# Patient Record
Sex: MALE | Race: WHITE | NOT HISPANIC OR LATINO | Employment: FULL TIME | URBAN - METROPOLITAN AREA
[De-identification: names, ages, dates, MRNs, and addresses within clinical notes are randomized per-mention and may not be internally consistent; named-entity substitution may affect disease eponyms.]

---

## 2017-05-13 ENCOUNTER — HOSPITAL ENCOUNTER (EMERGENCY)
Facility: CLINIC | Age: 47
Discharge: HOME | End: 2017-05-13
Attending: FAMILY MEDICINE | Admitting: FAMILY MEDICINE
Payer: COMMERCIAL

## 2017-05-13 VITALS
WEIGHT: 200 LBS | HEART RATE: 90 BPM | OXYGEN SATURATION: 98 % | SYSTOLIC BLOOD PRESSURE: 155 MMHG | DIASTOLIC BLOOD PRESSURE: 82 MMHG | HEIGHT: 71 IN | BODY MASS INDEX: 28 KG/M2 | RESPIRATION RATE: 16 BRPM | TEMPERATURE: 98.3 F

## 2017-05-13 DIAGNOSIS — S05.02XA CORNEAL ABRASION, LEFT, INITIAL ENCOUNTER: ICD-10-CM

## 2017-05-13 DIAGNOSIS — T15.02XA CORNEAL FOREIGN BODY, LEFT, INITIAL ENCOUNTER: ICD-10-CM

## 2017-05-13 PROCEDURE — 99283 EMERGENCY DEPT VISIT LOW MDM: CPT | Mod: 25 | Performed by: FAMILY MEDICINE

## 2017-05-13 PROCEDURE — 65220 REMOVE FOREIGN BODY FROM EYE: CPT | Mod: LT | Performed by: FAMILY MEDICINE

## 2017-05-13 PROCEDURE — 99283 EMERGENCY DEPT VISIT LOW MDM: CPT | Mod: 25

## 2017-05-13 PROCEDURE — 65222 REMOVE FOREIGN BODY FROM EYE: CPT

## 2017-05-13 RX ORDER — TETRACAINE HYDROCHLORIDE 5 MG/ML
1-2 SOLUTION OPHTHALMIC ONCE
Status: DISCONTINUED | OUTPATIENT
Start: 2017-05-13 | End: 2017-05-13 | Stop reason: HOSPADM

## 2017-05-13 ASSESSMENT — VISUAL ACUITY
OD: 20/40
OS: LIGHT PERCEPTION

## 2017-05-13 NOTE — ED AVS SNAPSHOT
[facility] Emergency Department    [address] [address]    Phone:  [phone]    Fax:  [phone]                                       Driss Saenz   MRN: [id number]    Department:  [facility] Emergency Department   Date of Visit:  5/13/2017           After Visit Summary Signature Page     I have received my discharge instructions, and my questions have been answered. I have discussed any challenges I see with this plan with the nurse or doctor.    ..........................................................................................................................................  Patient/Patient Representative Signature      ..........................................................................................................................................  Patient Representative Print Name and Relationship to Patient    ..................................................               ................................................  Date                                            Time    ..........................................................................................................................................  Reviewed by Signature/Title    ...................................................              ..............................................  Date                                                            Time

## 2017-05-13 NOTE — DISCHARGE INSTRUCTIONS
"Use Gentamicin ointment 3 times daily for up to 3 days.  Discontinue when symptoms have resolved.  Be seen if not improving each day and resolved within 3 days or at anytime if new or worsening symptoms.      Particle Removed from Eye (Corneal Foreign Body)    A particle got into your eye and stuck to the cornea (the clear part in the front of the eye). Your healthcare provider has removed this particle. The cornea is very sensitive and may still hurt for another 1 to 2 days while it heals.  If a metal particle was in your eye, a \"rust ring\" may have formed. This may require a second visit for complete removal.  Your healthcare provider may have put an eye patch on your eye. This may help the healing process. But you also may not receive an eye patch. For several types of injuries to the cornea, they are not recommended.  Home care    You may wear sunglasses to help decrease symptoms while the eye is healing, unless advised otherwise by your healthcare provider.    You may use acetaminophen or ibuprofen to control pain, unless another pain medicine was prescribed. (Note: If you have chronic liver or kidney disease, or if you have ever had a stomach ulcer or gastrointestinal bleeding, talk with your healthcare provider before using these medicines.)    If you received an eye patch:    It should not be left in place for more than 24 hours, unless advised otherwise by your healthcare provider.    Always keep your return appointment for patch removal and re-exam. Your eye could be harmed if the patch remains in place longer than advised.     Do not drive a motor vehicle or operate machinery with the patch in place. You will have difficulty judging distances with only one eye.    If eye drops or ointment were prescribed, use as directed.  Follow-up care    No eye patch: If no patch was used, but the pain continues for more than 48 hours, you should have another eye exam.     Eye patch: If your eye was patched and you " were given a return appointment for patch removal and re-exam, do not miss the visit. Again, it could be harmful to your eye if the patch remains in place longer than advised. However, if you were asked to remove the eye patch within 24 hours (or as directed by your healthcare provider), contact your healthcare provider immediately if your pain increases or get worse after removal of the eye patch.   When to seek medical advice  Call your healthcare provider right away if any of these occur:    Increasing eye pain or pain that does not improve after 24 hours    Discharge from the eye    Redness of the eye or swelling of the eyelids    Worsening vision    2975-1271 [employer]. [address] [place] [address], [place] [address]. All rights reserved. This information is not intended as a substitute for professional medical care. Always follow your healthcare professional's instructions.

## 2017-05-13 NOTE — ED PROVIDER NOTES
"  History     Chief Complaint   Patient presents with     Foreign Body in Eye     left eye     HPI  Driss Saenz is a 46 year old male who presents with left eye foreign body sensation.  Symptoms began yesterday but were worse today.  He had been working in a windy, ally environment at work but recalls no specific foreign body exposure.  Symptoms seem to get suddenly worse when he was riding his motorcycle.  He does not wear contact lenses.  He has no history of eye disease.  He has foreign body sensation but no eye pain and no loss of vision.    Patient Active Problem List   Diagnosis     CARDIOVASCULAR SCREENING; LDL GOAL LESS THAN 160     Moderate major depression (H)     Generalized anxiety disorder     No past medical history on file.  No past surgical history on file.    I have reviewed the Medications, Allergies, Past Medical and Surgical History, and Social History in the Epic system.    Review of Systems  Further problem focused system review negative.    Physical Exam   BP: 155/82  Pulse: 90  Temp: 98.3  F (36.8  C)  Resp: 16  Height: 180.3 cm (5' 11\")  Weight: 90.7 kg (200 lb)  SpO2: 98 %  Physical Exam  46-year-old in moderate discomfort due to eye irritation.  EOMI.  PERRLA.  There is left conjunctival injection without ciliary flush.  Anterior chamber is clear.  Upper lid was everted and wiped with a saline moistened cotton-tipped applicator.  This caused significant discomfort but no change in symptoms.  Examination was performed.  There is a tiny foreign body at the 9 o'clock position on the cornea at the limbus which was removed with an 18-gauge needle.  There is an ice rink sign present on the cornea on the superior aspect and a more significant abrasion at the 12 o'clock position suggestive of foreign body under the lid.  Given the finding of ice rink sign, the lid was again everted and wiped with a saline moistened cotton-tipped applicator.  No foreign body was identified.  The area was then " thoroughly irrigated and following this the foreign body sensation resolved.  There was some mild residual irritation but a clear difference in symptoms.  ED Course     ED Course     Procedures             Critical Care time:  none               Labs Ordered and Resulted from Time of ED Arrival Up to the Time of Departure from the ED - No data to display    Assessments & Plan (with Medical Decision Making)     46-year-old presents with foreign body sensation.  Slit-lamp examination showed ice rink sign.  No foreign body could be identified but irrigation led to resolution of the foreign body sensation.  He will use gentamicin ointment for 3 days 3 times daily.  He should be seen if symptoms don't improve daily and resolve within 3 days.    I have reviewed the nursing notes.    I have reviewed the findings, diagnosis, plan and need for follow up with the patient.    New Prescriptions    No medications on file       Final diagnoses:   Corneal foreign body, left, initial encounter   Corneal abrasion, left, initial encounter       5/13/2017   [facility] EMERGENCY DEPARTMENT     Lopez Velasquez MD  05/13/17 [id number]

## 2017-05-13 NOTE — ED AVS SNAPSHOT
" [facility] Emergency Department    [address] [facility] [facility] [address]    Phone:  [phone]    Fax:  [phone]                                       Driss Saenz   MRN: [id number]    Department:  [facility] Emergency Department   Date of Visit:  5/13/2017           Patient Information     Date Of Birth          1970        Your diagnoses for this visit were:     Corneal foreign body, left, initial encounter     Corneal abrasion, left, initial encounter        You were seen by Lopez Velasquez MD.        Discharge Instructions       Use Gentamicin ointment 3 times daily for up to 3 days.  Discontinue when symptoms have resolved.  Be seen if not improving each day and resolved within 3 days or at anytime if new or worsening symptoms.      Particle Removed from Eye (Corneal Foreign Body)    A particle got into your eye and stuck to the cornea (the clear part in the front of the eye). Your healthcare provider has removed this particle. The cornea is very sensitive and may still hurt for another 1 to 2 days while it heals.  If a metal particle was in your eye, a \"rust ring\" may have formed. This may require a second visit for complete removal.  Your healthcare provider may have put an eye patch on your eye. This may help the healing process. But you also may not receive an eye patch. For several types of injuries to the cornea, they are not recommended.  Home care    You may wear sunglasses to help decrease symptoms while the eye is healing, unless advised otherwise by your healthcare provider.    You may use acetaminophen or ibuprofen to control pain, unless another pain medicine was prescribed. (Note: If you have chronic liver or kidney disease, or if you have ever had a stomach ulcer or gastrointestinal bleeding, talk with your healthcare provider before using these medicines.)    If you received an eye patch:    It should not be left in place for more than 24 hours, unless advised " otherwise by your healthcare provider.    Always keep your return appointment for patch removal and re-exam. Your eye could be harmed if the patch remains in place longer than advised.     Do not drive a motor vehicle or operate machinery with the patch in place. You will have difficulty judging distances with only one eye.    If eye drops or ointment were prescribed, use as directed.  Follow-up care    No eye patch: If no patch was used, but the pain continues for more than 48 hours, you should have another eye exam.     Eye patch: If your eye was patched and you were given a return appointment for patch removal and re-exam, do not miss the visit. Again, it could be harmful to your eye if the patch remains in place longer than advised. However, if you were asked to remove the eye patch within 24 hours (or as directed by your healthcare provider), contact your healthcare provider immediately if your pain increases or get worse after removal of the eye patch.   When to seek medical advice  Call your healthcare provider right away if any of these occur:    Increasing eye pain or pain that does not improve after 24 hours    Discharge from the eye    Redness of the eye or swelling of the eyelids    Worsening vision    7530-9690 [employer]. [address] [place] [address]. All rights reserved. This information is not intended as a substitute for professional medical care. Always follow your healthcare professional's instructions.          24 Hour Appointment Hotline       To make an appointment at any [facility], call [phone] ([phone]). If you don't have a family doctor or clinic, we will help you find one. [facility] are conveniently located to serve the needs of you and your family.             Review of your medicines      Our records show that you are taking the medicines listed below. If these are incorrect, please call your family doctor or clinic.        Dose /  "Directions Last dose taken    citalopram 20 MG tablet   Commonly known as:  celeXA   Dose:  20 mg   Quantity:  30 tablet        Take 1 tablet (20 mg) by mouth daily   Refills:  1                Orders Needing Specimen Collection     None      Pending Results     No orders found from 2017 to 2017.            Pending Culture Results     No orders found from 2017 to 2017.            Pending Results Instructions     If you had any lab results that were not finalized at the time of your Discharge, you can call the ED Lab Result RN at [phone]. You will be contacted by this team for any positive Lab results or changes in treatment. The nurses are available 7 days a week from 10A to 6:30P.  You can leave a message 24 hours per day and they will return your call.        Test Results From Your Hospital Stay               Thank you for choosing [facility]       Thank you for choosing [facility] for your care. Our goal is always to provide you with excellent care. Hearing back from our patients is one way we can continue to improve our services. Please take a few minutes to complete the written survey that you may receive in the mail after you visit with us. Thank you!        "[employer]able Information     [email or web address] lets you send messages to your doctor, view your test results, renew your prescriptions, schedule appointments and more. To sign up, go to [email or web address] . Click on \"Log in\" on the left side of the screen, which will take you to the Welcome page. Then click on \"Sign up Now\" on the right side of the page.     You will be asked to enter the access code listed below, as well as some personal information. Please follow the directions to create your username and password.     Your access code is: [email or web address]  Expires: 2017 12:40 PM     Your access code will  in 90 days. If you need help or a new code, please call your [facility] clinic or [phone].        Care EveryWhere ID     This is " your Care EveryWhere ID. This could be used by other organizations to access your [facility] medical records  [id number]        After Visit Summary       This is your record. Keep this with you and show to your community pharmacist(s) and doctor(s) at your next visit.

## 2024-05-08 ENCOUNTER — APPOINTMENT (OUTPATIENT)
Dept: GENERAL RADIOLOGY | Facility: CLINIC | Age: 54
End: 2024-05-08
Attending: PHYSICIAN ASSISTANT
Payer: COMMERCIAL

## 2024-05-08 ENCOUNTER — HOSPITAL ENCOUNTER (EMERGENCY)
Facility: CLINIC | Age: 54
Discharge: HOME | End: 2024-05-08
Attending: PHYSICIAN ASSISTANT | Admitting: PHYSICIAN ASSISTANT
Payer: COMMERCIAL

## 2024-05-08 VITALS
HEART RATE: 87 BPM | TEMPERATURE: 98.1 F | DIASTOLIC BLOOD PRESSURE: 70 MMHG | OXYGEN SATURATION: 98 % | SYSTOLIC BLOOD PRESSURE: 145 MMHG | RESPIRATION RATE: 16 BRPM

## 2024-05-08 DIAGNOSIS — L03.90 CELLULITIS: ICD-10-CM

## 2024-05-08 PROCEDURE — 73070 X-RAY EXAM OF ELBOW: CPT | Mod: RT

## 2024-05-08 PROCEDURE — G0463 HOSPITAL OUTPT CLINIC VISIT: HCPCS | Performed by: PHYSICIAN ASSISTANT

## 2024-05-08 PROCEDURE — 99203 OFFICE O/P NEW LOW 30 MIN: CPT | Performed by: PHYSICIAN ASSISTANT

## 2024-05-08 RX ORDER — CEPHALEXIN 500 MG/1
500 CAPSULE ORAL 4 TIMES DAILY
Qty: 28 CAPSULE | Refills: 0 | Status: SHIPPED | OUTPATIENT
Start: 2024-05-08 | End: 2024-05-15

## 2024-05-08 ASSESSMENT — ACTIVITIES OF DAILY LIVING (ADL): ADLS_ACUITY_SCORE: 35

## 2024-05-08 ASSESSMENT — COLUMBIA-SUICIDE SEVERITY RATING SCALE - C-SSRS
2. HAVE YOU ACTUALLY HAD ANY THOUGHTS OF KILLING YOURSELF IN THE PAST MONTH?: NO
6. HAVE YOU EVER DONE ANYTHING, STARTED TO DO ANYTHING, OR PREPARED TO DO ANYTHING TO END YOUR LIFE?: NO
1. IN THE PAST MONTH, HAVE YOU WISHED YOU WERE DEAD OR WISHED YOU COULD GO TO SLEEP AND NOT WAKE UP?: NO

## 2024-05-08 NOTE — ED PROVIDER NOTES
History   No chief complaint on file.    HPI  Driss Saenz is a 53 year old male who presents urgent care with concern for right elbow pain after injury 3 days prior to arrival.  Patient reports that he was walking on vinyl steps when he tripped and fell hitting his elbow on several steps on the way down.  He sustained a pinpoint wound to the posterior aspect of the elbow which has intermittently had spurting yellow/red fluid.  He became more concerned when he developed increasing erythema, arm swelling within the last 24 hours.  He denies any fever, chills, myalgias, distal numbness or paresthesias.  No known history of MRSA.  His tetanus vaccine is out of date.     Allergies:  Allergies   Allergen Reactions    Sulfa Antibiotics        Problem List:    Patient Active Problem List    Diagnosis Date Noted    Moderate major depression (H) 09/30/2014     Priority: Medium    Generalized anxiety disorder 09/30/2014     Priority: Medium     Diagnosis updated by automated process. Provider to review and confirm.      CARDIOVASCULAR SCREENING; LDL GOAL LESS THAN 160 10/31/2010     Priority: Medium        Past Medical History:    No past medical history on file.    Past Surgical History:    No past surgical history on file.    Family History:    Family History   Problem Relation Age of Onset    Cancer Paternal Grandfather     Prostate Cancer Paternal Grandfather     Prostate Cancer Father        Social History:  Marital Status:  Single [1]  Social History     Tobacco Use    Smoking status: Never    Smokeless tobacco: Former     Types: Chew   Substance Use Topics    Alcohol use: Yes     Comment: 1 case beers per week    Drug use: No        Medications:    citalopram (CELEXA) 20 MG tablet      Review of Systems  CONSTITUTIONAL:NEGATIVE for fever, chills, change in weight  INTEGUMENTARY/SKIN: POSITIVE for erythema, wound right elbow   RESP:NEGATIVE for significant cough or SOB  MUSCULOSKELETAL: POSITIVE  for right elbow  pain, swelling and NEGATIVE for other concerning arthralgias or myalgias   NEURO: NEGATIVE for numbness, paresthesias   Physical Exam   BP: (!) 145/70  Pulse: 87  Temp: 98.1  F (36.7  C)  Resp: 16  SpO2: 98 %  Physical Exam  Constitutional:       General: He is not in acute distress.     Appearance: He is not ill-appearing or toxic-appearing.   HENT:      Head: Normocephalic and atraumatic.   Cardiovascular:      Pulses:           Radial pulses are 2+ on the right side.   Musculoskeletal:      Right elbow: Swelling present. No effusion. Normal range of motion. Tenderness present in olecranon process.      Right wrist: Normal.      Comments: Pinpoint size wound to the posterior aspect of the right elbow overlying olecranon process and olecranon bursa, some swelling olecranon bursa, diffuse erythema of the elbow, distal upper arm, proximal forearm    Skin:     General: Skin is warm and dry.      Findings: Erythema and wound present. No ecchymosis or rash.   Neurological:      Mental Status: He is alert.      Sensory: No sensory deficit.       ED Course        Procedures       Critical Care time:  none          Results for orders placed or performed during the hospital encounter of 05/08/24 (from the past 24 hour(s))   Elbow XR, 2 views, right    Narrative    Examination:  XR ELBOW RIGHT 2 VIEWS    Date:  5/8/2024 4:07 PM     Clinical Information: Right elbow pain and swelling, history of a  fall.    Comparison: none.      Impression    Impression:    1.  Soft tissue swelling over the posterior elbow. No fracture or  erosion. Normal joint alignment and spacing. No significant joint  effusion.     SATYA MONTAÑO MD         SYSTEM ID:  [id number]     Medications - No data to display    Assessments & Plan (with Medical Decision Making)     I have reviewed the nursing notes.  I have reviewed the findings, diagnosis, plan and need for follow up with the patient.       New Prescriptions    CEPHALEXIN (KEFLEX) 500 MG  CAPSULE    Take 1 capsule (500 mg) by mouth 4 times daily for 7 days     Final diagnoses:   Cellulitis     53-year-old male presents urgent care with concern for right elbow pain, swelling after he sustained a fall earlier this week with increasing erythema, warmth, discomfort the last several days.  Physical exam findings are consistent with cellulitis with diffuse erythema, warmth, overlying elbow,  proximal forearm, distal upper arm.  He had pinpoint wound on the posterior aspect of the elbow overlying the olecranon bursa which he states intermittently drains clear yellow fluid concern for possible infectious bursitis or bursa injury. I do not suspect septic arthritis.  X-ray was obtained and was negative for evidence of acute bony abnormality, radiopaque foreign body.  He refused to update tetanus vaccine at this time.  He discharged home stable with prescription for Keflex and instructions for rest of the elbow, warm compresses 10 to 15 minutes 3-4 times daily.  Follow-up with Ortho if no improvement within the next 3 to 4 days,  referral placed.     Disclaimer: This note consists of symbols derived from keyboarding, dictation, and/or voice recognition software. As a result, there may be errors in the script that have gone undetected.  Please consider this when interpreting information found in the chart.    5/8/2024   [facility] EMERGENCY DEPT       Mary Marroquin PA-C  05/08/24 2099

## 2024-08-29 ENCOUNTER — OFFICE VISIT (OUTPATIENT)
Dept: FAMILY MEDICINE | Facility: CLINIC | Age: 54
End: 2024-08-29
Payer: COMMERCIAL

## 2024-08-29 VITALS
TEMPERATURE: 97.5 F | SYSTOLIC BLOOD PRESSURE: 128 MMHG | RESPIRATION RATE: 16 BRPM | WEIGHT: 200.8 LBS | BODY MASS INDEX: 28.75 KG/M2 | OXYGEN SATURATION: 98 % | HEART RATE: 71 BPM | DIASTOLIC BLOOD PRESSURE: 86 MMHG | HEIGHT: 70 IN

## 2024-08-29 DIAGNOSIS — K64.4 EXTERNAL HEMORRHOIDS: ICD-10-CM

## 2024-08-29 DIAGNOSIS — K64.8 INTERNAL HEMORRHOIDS: ICD-10-CM

## 2024-08-29 DIAGNOSIS — Z12.11 SCREEN FOR COLON CANCER: ICD-10-CM

## 2024-08-29 DIAGNOSIS — N52.9 ERECTILE DYSFUNCTION, UNSPECIFIED ERECTILE DYSFUNCTION TYPE: ICD-10-CM

## 2024-08-29 DIAGNOSIS — Z30.2 ENCOUNTER FOR VASECTOMY: Primary | ICD-10-CM

## 2024-08-29 PROBLEM — N52.8 OTHER MALE ERECTILE DYSFUNCTION: Status: ACTIVE | Noted: 2024-08-29

## 2024-08-29 LAB
ALBUMIN SERPL BCG-MCNC: 4.3 G/DL (ref 3.5–5.2)
ALP SERPL-CCNC: 77 U/L (ref 40–150)
ALT SERPL W P-5'-P-CCNC: 29 U/L (ref 0–70)
ANION GAP SERPL CALCULATED.3IONS-SCNC: 8 MMOL/L (ref 7–15)
AST SERPL W P-5'-P-CCNC: 26 U/L (ref 0–45)
BILIRUB SERPL-MCNC: 0.4 MG/DL
BUN SERPL-MCNC: 13.5 MG/DL (ref 6–20)
CALCIUM SERPL-MCNC: 9.2 MG/DL (ref 8.8–10.4)
CHLORIDE SERPL-SCNC: 106 MMOL/L (ref 98–107)
CHOLEST SERPL-MCNC: 170 MG/DL
CREAT SERPL-MCNC: 1.06 MG/DL (ref 0.67–1.17)
EGFRCR SERPLBLD CKD-EPI 2021: 85 ML/MIN/1.73M2
ERYTHROCYTE [DISTWIDTH] IN BLOOD BY AUTOMATED COUNT: 12.4 % (ref 10–15)
FASTING STATUS PATIENT QL REPORTED: YES
FASTING STATUS PATIENT QL REPORTED: YES
GLUCOSE SERPL-MCNC: 98 MG/DL (ref 70–99)
HBA1C MFR BLD: 5.1 % (ref 0–5.6)
HCO3 SERPL-SCNC: 27 MMOL/L (ref 22–29)
HCT VFR BLD AUTO: 43.8 % (ref 40–53)
HDLC SERPL-MCNC: 55 MG/DL
HGB BLD-MCNC: 14.3 G/DL (ref 13.3–17.7)
LDLC SERPL CALC-MCNC: 103 MG/DL
MCH RBC QN AUTO: 29.9 PG (ref 26.5–33)
MCHC RBC AUTO-ENTMCNC: 32.6 G/DL (ref 31.5–36.5)
MCV RBC AUTO: 92 FL (ref 78–100)
NONHDLC SERPL-MCNC: 115 MG/DL
PLATELET # BLD AUTO: 325 10E3/UL (ref 150–450)
POTASSIUM SERPL-SCNC: 4.4 MMOL/L (ref 3.4–5.3)
PROT SERPL-MCNC: 7.3 G/DL (ref 6.4–8.3)
RBC # BLD AUTO: 4.78 10E6/UL (ref 4.4–5.9)
SODIUM SERPL-SCNC: 141 MMOL/L (ref 135–145)
TRIGL SERPL-MCNC: 62 MG/DL
TSH SERPL DL<=0.005 MIU/L-ACNC: 1.33 UIU/ML (ref 0.3–4.2)
WBC # BLD AUTO: 5.6 10E3/UL (ref 4–11)

## 2024-08-29 PROCEDURE — 84403 ASSAY OF TOTAL TESTOSTERONE: CPT | Performed by: NURSE PRACTITIONER

## 2024-08-29 PROCEDURE — 90471 IMMUNIZATION ADMIN: CPT | Performed by: NURSE PRACTITIONER

## 2024-08-29 PROCEDURE — 80053 COMPREHEN METABOLIC PANEL: CPT | Performed by: NURSE PRACTITIONER

## 2024-08-29 PROCEDURE — 85027 COMPLETE CBC AUTOMATED: CPT | Performed by: NURSE PRACTITIONER

## 2024-08-29 PROCEDURE — 84443 ASSAY THYROID STIM HORMONE: CPT | Performed by: NURSE PRACTITIONER

## 2024-08-29 PROCEDURE — 90715 TDAP VACCINE 7 YRS/> IM: CPT | Performed by: NURSE PRACTITIONER

## 2024-08-29 PROCEDURE — 80061 LIPID PANEL: CPT | Performed by: NURSE PRACTITIONER

## 2024-08-29 PROCEDURE — 99213 OFFICE O/P EST LOW 20 MIN: CPT | Mod: 25 | Performed by: NURSE PRACTITIONER

## 2024-08-29 PROCEDURE — 36415 COLL VENOUS BLD VENIPUNCTURE: CPT | Performed by: NURSE PRACTITIONER

## 2024-08-29 PROCEDURE — 83036 HEMOGLOBIN GLYCOSYLATED A1C: CPT | Performed by: NURSE PRACTITIONER

## 2024-08-29 ASSESSMENT — ANXIETY QUESTIONNAIRES
GAD7 TOTAL SCORE: 2
7. FEELING AFRAID AS IF SOMETHING AWFUL MIGHT HAPPEN: NOT AT ALL
2. NOT BEING ABLE TO STOP OR CONTROL WORRYING: NOT AT ALL
6. BECOMING EASILY ANNOYED OR IRRITABLE: NOT AT ALL
1. FEELING NERVOUS, ANXIOUS, OR ON EDGE: NOT AT ALL
3. WORRYING TOO MUCH ABOUT DIFFERENT THINGS: NOT AT ALL
GAD7 TOTAL SCORE: 2
IF YOU CHECKED OFF ANY PROBLEMS ON THIS QUESTIONNAIRE, HOW DIFFICULT HAVE THESE PROBLEMS MADE IT FOR YOU TO DO YOUR WORK, TAKE CARE OF THINGS AT HOME, OR GET ALONG WITH OTHER PEOPLE: NOT DIFFICULT AT ALL
5. BEING SO RESTLESS THAT IT IS HARD TO SIT STILL: NOT AT ALL

## 2024-08-29 ASSESSMENT — PATIENT HEALTH QUESTIONNAIRE - PHQ9
SUM OF ALL RESPONSES TO PHQ QUESTIONS 1-9: 1
SUM OF ALL RESPONSES TO PHQ QUESTIONS 1-9: 1
5. POOR APPETITE OR OVEREATING: MORE THAN HALF THE DAYS
10. IF YOU CHECKED OFF ANY PROBLEMS, HOW DIFFICULT HAVE THESE PROBLEMS MADE IT FOR YOU TO DO YOUR WORK, TAKE CARE OF THINGS AT HOME, OR GET ALONG WITH OTHER PEOPLE: NOT DIFFICULT AT ALL

## 2024-08-29 ASSESSMENT — PAIN SCALES - GENERAL: PAINLEVEL: NO PAIN (0)

## 2024-08-29 NOTE — LETTER
August 29, 2024      Driss Saenz  [address] [place] [id number]        Dear ,    We are writing to inform you of your test results.    Please send a letter with results:     Your blood levels, diabetes screening, electrolytes, kidney/liver function, thyroid and cholesterol testing is all normal.  The testosterone testing can take a couple days to come back.  I will notify you of this result when I see it.   Kami Champagne NP on 8/29/2024 at 10:43 AM     Resulted Orders   CBC with platelets   Result Value Ref Range    WBC Count 5.6 4.0 - 11.0 10e3/uL    RBC Count 4.78 4.40 - 5.90 10e6/uL    Hemoglobin 14.3 13.3 - 17.7 g/dL    Hematocrit 43.8 40.0 - 53.0 %    MCV 92 78 - 100 fL    MCH 29.9 26.5 - 33.0 pg    MCHC 32.6 31.5 - 36.5 g/dL    RDW 12.4 10.0 - 15.0 %    Platelet Count 325 150 - 450 10e3/uL   Comprehensive metabolic panel (BMP + Alb, Alk Phos, ALT, AST, Total. Bili, TP)   Result Value Ref Range    Sodium 141 135 - 145 mmol/L    Potassium 4.4 3.4 - 5.3 mmol/L    Carbon Dioxide (CO2) 27 22 - 29 mmol/L    Anion Gap 8 7 - 15 mmol/L    Urea Nitrogen 13.5 6.0 - 20.0 mg/dL    Creatinine 1.06 0.67 - 1.17 mg/dL    GFR Estimate 85 >60 mL/min/1.73m2      Comment:      eGFR calculated using 2021 CKD-EPI equation.    Calcium 9.2 8.8 - 10.4 mg/dL      Comment:      Reference intervals for this test were updated on 7/16/2024 to reflect our healthy population more accurately. There may be differences in the flagging of prior results with similar values performed with this method. Those prior results can be interpreted in the context of the updated reference intervals.    Chloride 106 98 - 107 mmol/L    Glucose 98 70 - 99 mg/dL    Alkaline Phosphatase 77 40 - 150 U/L    AST 26 0 - 45 U/L    ALT 29 0 - 70 U/L    Protein Total 7.3 6.4 - 8.3 g/dL    Albumin 4.3 3.5 - 5.2 g/dL    Bilirubin Total 0.4 <=1.2 mg/dL    Patient Fasting > 8hrs? Yes       Comment:      Fasting since 1900 on 08/28/2024   TSH with  free T4 reflex   Result Value Ref Range    TSH 1.33 0.30 - 4.20 uIU/mL   Lipid panel reflex to direct LDL Non-fasting   Result Value Ref Range    Cholesterol 170 <200 mg/dL    Triglycerides 62 <150 mg/dL    Direct Measure HDL 55 >=40 mg/dL    LDL Cholesterol Calculated 103 (H) <=100 mg/dL    Non HDL Cholesterol 115 <130 mg/dL    Patient Fasting > 8hrs? Yes       Comment:      Fasting since 1900 on 08/28/2024  Fasting since 1900 on 08/28/2024  Fasting since 1900 on 08/28/2024    Narrative    Cholesterol  Desirable:  <200 mg/dL    Triglycerides  Normal:  Less than 150 mg/dL  Borderline High:  150-199 mg/dL  High:  200-499 mg/dL  Very High:  Greater than or equal to 500 mg/dL    Direct Measure HDL  Female:  Greater than or equal to 50 mg/dL   Male:  Greater than or equal to 40 mg/dL    LDL Cholesterol  Desirable:  <100mg/dL  Above Desirable:  100-129 mg/dL   Borderline High:  130-159 mg/dL   High:  160-189 mg/dL   Very High:  >= 190 mg/dL    Non HDL Cholesterol  Desirable:  130 mg/dL  Above Desirable:  130-159 mg/dL  Borderline High:  160-189 mg/dL  High:  190-219 mg/dL  Very High:  Greater than or equal to 220 mg/dL   Hemoglobin A1c   Result Value Ref Range    Hemoglobin A1C 5.1 0.0 - 5.6 %      Comment:      Normal <5.7%   Prediabetes 5.7-6.4%    Diabetes 6.5% or higher     Note: Adopted from ADA consensus guidelines.       If you have any questions or concerns, please call the clinic at the number listed above.       Sincerely,      Kami Champagne NP

## 2024-08-29 NOTE — PATIENT INSTRUCTIONS
Make an appointment with general surgery for hemorrhoids.  Make appointment with urology for vasectomy consult.  Make appointment for colonoscopy.  Get labs completed.  I will notify you of results.  If negative, I can continue Cialis treatment if this is less expensive for you.

## 2024-08-29 NOTE — PROGRESS NOTES
Assessment & Plan     Encounter for vasectomy  Urology referral placed for vasectomy consult.  - Adult Urology  Referral; Future    External hemorrhoids  Due to daily issues with hemorrhoids, referred to general surgery for consult on hemorrhoidectomy.  - Adult Gen Surg  Referral; Future    Internal hemorrhoids  See note above.  - Adult Gen Surg  Referral; Future    Erectile dysfunction, unspecified erectile dysfunction type  Patient has been treating himself with [facility] online erectile dysfunction treatment of Cialis.  I am working up testosterone, CBC, CMP, TSH, lipid, and A1c for evaluation of his rectal dysfunction.  If negative would fill his Cialis to see if this is cheaper than online.  If any abnormalities patient will be referred to urology or endocrinology for treatment.  If labs are all negative, would plan to continue Cialis dosing.  - Testosterone, total; Future  - CBC with platelets; Future  - Comprehensive metabolic panel (BMP + Alb, Alk Phos, ALT, AST, Total. Bili, TP); Future  - TSH with free T4 reflex; Future  - Lipid panel reflex to direct LDL Non-fasting; Future  - Hemoglobin A1c; Future  - Testosterone, total  - CBC with platelets  - Comprehensive metabolic panel (BMP + Alb, Alk Phos, ALT, AST, Total. Bili, TP)  - TSH with free T4 reflex  - Lipid panel reflex to direct LDL Non-fasting  - Hemoglobin A1c    Screen for colon cancer  Colonoscopy ordered for screening due to hand family history  - REVIEW OF HEALTH MAINTENANCE PROTOCOL ORDERS  - Colonoscopy Screening  Referral; Future    See Patient Instructions    Stephie Naqvi is a 53 year old, presenting for the following health issues:  Rectal Problem and Consult (Vasectomy )        8/29/2024     7:39 AM   Additional Questions   Roomed by rmb   Accompanied by self         8/29/2024     7:39 AM   Patient Reported Additional Medications   Patient reports taking the following new medications none     History  "of Present Illness       Reason for visit:  Hemroids vastectomy inquarry  Symptoms include:  Bleeding  Symptom intensity:  Moderate  Symptom progression:  Staying the same  Had these symptoms before:  Yes  Has tried/received treatment for these symptoms:  No  What makes it worse:  Needles  What makes it better:  Beer   He is taking medications regularly.     Patient has been having issues with internal and external hemorrhoids for about 20 to 25 years.  He states it is bothering him daily.  He has to use toilet paper between his cheeks to stop the bleeding.  He denies any constipation and states that his bowels have been normal daily.    He would like a referral for vasectomy today.    Patient has history of erectile dysfunction.  He describes that he does have a sexual appetite, he has no trouble having an erection which is keeping the erection long enough.  He has been online to [facility] who has been treating him with Cialis daily.  He states that no one is ever worked up the cause for this    Review of Systems  CONSTITUTIONAL: NEGATIVE for fever, chills, change in weight  RESP: NEGATIVE for significant cough or SOB  CV: NEGATIVE for chest pain, palpitations or peripheral edema  GI: POSITIVE for hemorrhoids  : POSITIVE  for erectile dysfunction  PSYCHIATRIC: NEGATIVE for changes in mood or affect  ROS otherwise negative      Objective    /86   Pulse 71   Temp 97.5  F (36.4  C) (Tympanic)   Resp 16   Ht 1.774 m (5' 9.84\")   Wt 91.1 kg (200 lb 12.8 oz)   SpO2 98%   BMI 28.94 kg/m    Body mass index is 28.94 kg/m .  Physical Exam   GENERAL: alert and no distress  RESP: lungs clear to auscultation - no rales, rhonchi or wheezes  CV: regular rate and rhythm, normal S1 S2, no S3 or S4, no murmur, click or rub, no peripheral edema  PSYCH: mentation appears normal, affect normal/bright        Signed Electronically by: Kami Champagne NP    "

## 2024-09-03 DIAGNOSIS — N52.9 ERECTILE DYSFUNCTION, UNSPECIFIED ERECTILE DYSFUNCTION TYPE: Primary | ICD-10-CM

## 2024-09-03 LAB — TESTOST SERPL-MCNC: 374 NG/DL (ref 240–950)

## 2024-09-03 RX ORDER — TADALAFIL 2.5 MG/1
2.5 TABLET ORAL DAILY
Qty: 90 TABLET | Refills: 3 | Status: SHIPPED | OUTPATIENT
Start: 2024-09-03 | End: 2024-09-06

## 2024-09-05 NOTE — PROGRESS NOTES
UROLOGY CLINIC NEW VISIT    Chief Complaint:  Desire for elective sterilization, ED, PD    Referring Provider:  Christian Salomon    Subjective:     Driss Saenz is a 53 year old male and is a new patient to the urology clinic seen today for family planning in consultation for Dr. Salomon, also with ED and stable phase PD.     No prior surgeries, no other significant medical history.   No anticoagulation or bleeding dyscrasias. 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 Had taken Benzo for Lasic eye surgery. 5/10 Erection  Has discussed with partner they are in agreement. Has 4 children     ED 1-2 years. Problems keeping erection, hard to get  Has cialis script has not tried yet. Took HIMs,   T 374     PD/Curvature is present; is estimated 45 degrees. Causing some pain. Has had some slow progression, stable now. No pain at rest.      ASCVD Risk 6.7%, he has appropriate METS     Family history prostate cancer 78, no DM or Dupytrens     Currently the patient has no fever, chills, nausea, vomiting or other signs/symptoms suggestive of acute systemic infection.    PMH  No past medical history on file.  PSH  No past surgical history on file.  FAMHx  Family History   Problem Relation Age of Onset    Cancer Paternal Grandfather     Prostate Cancer Paternal Grandfather     Prostate Cancer Father      ALLERGY  Allergies   Allergen Reactions    Sulfa Antibiotics      MEDICATIONS  has a current medication list which includes the following prescription(s): tadalafil.  ROS:  Constitutional: negative  Eyes: negative  Ears/Nose/Throat/Mouth: negative  Respiratory: negative  Cardiovascular: negative  Gastrointestinal: negative  Genito-Urinary: as documented above in HPI  Musculoskeletal: negative  Neurological: negative  Integumentary: negative      Objective:     There were no vitals taken for this visit.   Physical Exam:  GENERAL: Patient is AOx3, in no acute distress  CARDIAC: regular rate  LUNG: breathing non labored  ABD: soft,  "nondistended  : normal circumcised phallus, testes descended bilaterally  Testes without masses.  Vasa and epididymides are present bilaterally.    Vasa palpable bilaterally 2   Prostate: defer       LABORATORY:  No results found for: \"CREATININE\"  No results found for: \"PSA\"     Assessment:       Driss Saenz is a 53 year old male and is a new patient to the urology clinic seen today for family planning in consultation, desiring elective vasectomy, ED, PD.      Plan:     We discussed with the patient the following:  Vasectomy is intended to be a permanent form of contraception.   Vasectomy does not produce immediate sterility.   Following vasectomy, another form of contraception is required until vas occlusion is confirmed by post- vasectomy semen analysis (PVSA).   Even after vas occlusion is confirmed, vasectomy is not 100% reliable in preventing pregnancy.   The risk of pregnancy after vasectomy is approximately 1 in 2,000 for men who have post-vasectomy azoospermia or PVSA showing rare non-motile sperm (RNMS).   Repeat vasectomy is necessary in ?1% of vasectomies, provided that a technique for vas occlusion known to have a low occlusive failure rate has been used.   Patients should refrain from ejaculation for approximately one week after vasectomy.   Options for fertility after vasectomy include vasectomy reversal and sperm retrieval with in vitro fertilization. These options are not always successful, and they may be expensive.   The rates of surgical complications such as symptomatic hematoma and infection are 1-2%. These rates vary with the surgeon's experience and the criteria used to diagnose these conditions.   Chronic scrotal pain associated with negative impact on quality of life occurs after vasectomy in about 1-2% of men. Few of these men require additional surgery.   Other permanent and non-permanent alternatives to vasectomy are available.    Patient with significant Vasovagal episode, after " discussion would be unwise to perform Vasectomy in office. Plan for vasectomy in the OR after clearance by PCP/Cards, patient aware.   Recommended he go to ER for evaluation, however vitals stabilized and patient preferred not to go to ED favoring re-evaluation with PCP. Discussed risks benefits and return precautions. He will make appointment with PCP promptly.     ED    - Elevated ASCVD, refer cardiology for potential CAC testing discussed with patient and recommended especially given syncopal episode   - Cialis daily, precautions discussed     PD  - Seems to be in stable phase, plan for Cialis daily co treat PD as well   - Want to complete Vasectomy first, will see how curvature is with better erections. Will likely need ICI or Duplex for formal curvature assessment     BPH with LUTS   - Cialis daily    Men's health, family history prostate cancer   - PSA screen today

## 2024-09-06 ENCOUNTER — OFFICE VISIT (OUTPATIENT)
Dept: UROLOGY | Facility: CLINIC | Age: 54
End: 2024-09-06
Attending: NURSE PRACTITIONER
Payer: COMMERCIAL

## 2024-09-06 VITALS — HEART RATE: 64 BPM | TEMPERATURE: 97 F | SYSTOLIC BLOOD PRESSURE: 131 MMHG | DIASTOLIC BLOOD PRESSURE: 70 MMHG

## 2024-09-06 DIAGNOSIS — N40.0 BENIGN PROSTATIC HYPERPLASIA, UNSPECIFIED WHETHER LOWER URINARY TRACT SYMPTOMS PRESENT: ICD-10-CM

## 2024-09-06 DIAGNOSIS — Z30.2 ENCOUNTER FOR VASECTOMY: ICD-10-CM

## 2024-09-06 DIAGNOSIS — N52.01 ERECTILE DYSFUNCTION DUE TO ARTERIAL INSUFFICIENCY: ICD-10-CM

## 2024-09-06 DIAGNOSIS — N48.6 PEYRONIE'S DISEASE: ICD-10-CM

## 2024-09-06 DIAGNOSIS — Z80.42 FAMILY HISTORY OF PROSTATE CANCER: Primary | ICD-10-CM

## 2024-09-06 PROCEDURE — 99204 OFFICE O/P NEW MOD 45 MIN: CPT | Performed by: STUDENT IN AN ORGANIZED HEALTH CARE EDUCATION/TRAINING PROGRAM

## 2024-09-06 RX ORDER — TADALAFIL 5 MG/1
5 TABLET ORAL DAILY
Qty: 60 TABLET | Refills: 4 | Status: SHIPPED | OUTPATIENT
Start: 2024-09-06 | End: 2025-07-03

## 2024-09-06 ASSESSMENT — PAIN SCALES - GENERAL: PAINLEVEL: NO PAIN (0)

## 2024-09-09 ENCOUNTER — OFFICE VISIT (OUTPATIENT)
Dept: SURGERY | Facility: CLINIC | Age: 54
End: 2024-09-09
Attending: NURSE PRACTITIONER
Payer: COMMERCIAL

## 2024-09-09 VITALS
OXYGEN SATURATION: 99 % | SYSTOLIC BLOOD PRESSURE: 160 MMHG | HEART RATE: 101 BPM | DIASTOLIC BLOOD PRESSURE: 89 MMHG | WEIGHT: 202 LBS | HEIGHT: 70 IN | BODY MASS INDEX: 28.92 KG/M2

## 2024-09-09 DIAGNOSIS — K64.8 INTERNAL HEMORRHOIDS: Primary | ICD-10-CM

## 2024-09-09 DIAGNOSIS — L29.0 PRURITUS ANI: ICD-10-CM

## 2024-09-09 DIAGNOSIS — K64.4 EXTERNAL HEMORRHOIDS: ICD-10-CM

## 2024-09-09 PROCEDURE — 99243 OFF/OP CNSLTJ NEW/EST LOW 30: CPT | Performed by: STUDENT IN AN ORGANIZED HEALTH CARE EDUCATION/TRAINING PROGRAM

## 2024-09-09 RX ORDER — ZINC OXIDE 20 %
OINTMENT (GRAM) TOPICAL PRN
Qty: 30 G | Refills: 1 | Status: SHIPPED | OUTPATIENT
Start: 2024-09-09

## 2024-09-09 NOTE — LETTER
"9/9/2024      Driss Saenz  [address] [place] [id number]      Dear Colleague,    Thank you for referring your patient, Driss Saenz, to the [facility]. Please see a copy of my visit note below.    Surgical Consultation/History and Physical  [facility]    Driss is seen in consultation for hemorrhoids, at the request of Kami Champagne NP.    Chief Complaint:  rectal bleeding and discomfort    History of Present Illness: Driss Saenz is a 54 year old male who presents to surgery clinic for evaluation of rectal bleeding and discomfort. Symptoms have been present for several years and are associated with anal itching. Prior episodes in the past involved symptoms worse with wiping. Patient describes blood in stool as bright red and dripping into toilet bowl, sometimes requiring tucking toilet paper between buttocks to catch blood. Describes bowel habits as regular, sometimes 1-2 times daily, denies straining. Denies any prior episodes of gas or fecal incontinence. Has not tried any treatments  for management of this in the past.  Denies any abdominal pain, nausea, emesis. Has had a colonoscopy before. States he drinks about 1-2 L of water daily, does not eat many vegetables and usually has a \"meat and potatoes\" diet.    Patient Active Problem List   Diagnosis     CARDIOVASCULAR SCREENING; LDL GOAL LESS THAN 160     Other male erectile dysfunction       No past medical history on file.    No past surgical history on file.    Family History   Problem Relation Age of Onset     Cancer Paternal Grandfather      Prostate Cancer Paternal Grandfather      Prostate Cancer Father        Social History     Tobacco Use     Smoking status: Never     Smokeless tobacco: Current     Tobacco comments:     Nicotine pouches    Substance Use Topics     Alcohol use: Yes     Comment: 3-6 beers a day or a case beer per week        History   Drug Use No       Current Outpatient " "Medications   Medication Sig Dispense Refill     tadalafil (CIALIS) 5 MG tablet Take 1 tablet (5 mg) by mouth daily. (Patient not taking: Reported on 9/9/2024) 60 tablet 4       Allergies   Allergen Reactions     Sulfa Antibiotics        Review of Systems:   10 point ROS negative other than what was listed in HPI    Physical Exam:  BP (!) 160/89   Pulse 101   Ht 1.774 m (5' 9.84\")   Wt 91.6 kg (202 lb)   SpO2 99%   BMI 29.12 kg/m      Constitutional- No acute distress, well nourished, non-toxic  Eyes: Anicteric, no injection.  PERRL  ENT:  Normocephalic, atraumatic, Nose midline, moist mucus membranes  Neck - supple, no LAD  Respiratory- Nonlabored breathing on room air  Cardiovascular - Extremities warm and well perfused  Abdomen - Soft, non-tender, +BS, no hepatosplenomegaly, no palpable masses  Groins - 2+ pulses bilaterally and no LAD, no masses  Rectal -external exam with perianal excoriation.  Digital rectal exam with no appreciable masses, good tone, guaiac negative.  Anoscope exam revealed enlarged internal hemorrhoids with no active bleeding, more prominent in left lateral quadrant.  Neuro - No focal neuro deficits, Alert and oriented x 3  Psych: Appropriate mood and affect  Musculoskeletal: Normal gait, symmetric strength.  FROM upper and lower extremities.  Skin: Warm, Dry    Assessment:  1. Driss Saenz is a 54 year old male who presents to surgery clinic for evaluation of anal bleeding and discomfort. These symptoms appear to be consistent with both pruritus ani andsymptomatic internal hemorrhoids. They are Grade I based on described symptoms. Explained to the patient the pathophysiology of hemorrhoids and that these are natural collections of submucosal sinusoids within the anorectum.  Symptomatic hemorrhoids can come from chronic irritation that causes vascular congestion and enlargement of hemorrhoids.  I explained that chronic irritation can also cause pruritus ani, leading to symptoms of " itching and discomfort.  Explained to the patient the pathophysiology of pruritus ani. For both hemorrhoids and pruritus ani, I recommended treatment to improve anal hygiene, dietary modification, and protection of skin. This can be accomplished with increasing fiber intake to 30 grams /day, increasing water intake to 2 L / day.  I counseled the patient to track and write down exactly how they are consuming and how to monitor the fiber content of foods through nutritional labels. This should help promote good colon health, bulk stools, and decrease stool residue on the perianal skin    For symptom management, I recommend keeping the area clean daily sitz baths. Zinc oxide cream to be prescribed to protect perianal skin.     I also explained that procedural intervention for hemorrhoids is an option, including possible banding or surgical excision. However, as he has not tried treatment for hemorrhoids in the past, I recommend starting with a non-operative approach.  If still symptomatic after 3 months, then we can further discuss surgical options for management of the hemorrhoids.  Patient expressed understanding and was amenable to the proposed plan.       Plan:   Increase fiber intake to 30 grams daily  Increase water intake to 2 L daily  Zinc oxide cream twice daily to anus for symptom control  Sitz baths twice daily  If symptoms still present, return to clinic in 3 months for re-evaluation      Chao Ordaz MD  9/9/2024 at 2:28 PM      Again, thank you for allowing me to participate in the care of your patient.        Sincerely,        Chao Ordaz MD

## 2024-09-09 NOTE — NURSING NOTE
"Chief Complaint   Patient presents with    Consult     External hemorrhoids & Internal hemorrhoids       Vitals:    09/09/24 1435   BP: (!) 160/89   Pulse: 101   SpO2: 99%   Weight: 91.6 kg (202 lb)   Height: 1.774 m (5' 9.84\")     Wt Readings from Last 1 Encounters:   09/09/24 91.6 kg (202 lb)     Tanya Trotter MA      "

## 2024-09-09 NOTE — PATIENT INSTRUCTIONS
Increase fiber intake to 30 grams daily  Increase water intake to 2 L daily  Zinc oxide cream twice daily to anus for symptom control  Sitz baths twice daily (- For sitz baths, fill a tub with warm water and add a spoonfull of epsom salt. Sit in the tub with the wound completely submerged for about 10-15 min. Once done, pat the wound dry. You can cover the wound with gauze if any drainage present. )  If symptoms still present, return to clinic in 3 months for re-evaluation

## 2024-09-09 NOTE — PROGRESS NOTES
"Surgical Consultation/History and Physical  [facility]    Driss is seen in consultation for hemorrhoids, at the request of Kami Champagne NP.    Chief Complaint:  rectal bleeding and discomfort    History of Present Illness: Driss Saenz is a 54 year old male who presents to surgery clinic for evaluation of rectal bleeding and discomfort. Symptoms have been present for several years and are associated with anal itching. Prior episodes in the past involved symptoms worse with wiping. Patient describes blood in stool as bright red and dripping into toilet bowl, sometimes requiring tucking toilet paper between buttocks to catch blood. Describes bowel habits as regular, sometimes 1-2 times daily, denies straining. Denies any prior episodes of gas or fecal incontinence. Has not tried any treatments  for management of this in the past.  Denies any abdominal pain, nausea, emesis. Has had a colonoscopy before. States he drinks about 1-2 L of water daily, does not eat many vegetables and usually has a \"meat and potatoes\" diet.    Patient Active Problem List   Diagnosis    CARDIOVASCULAR SCREENING; LDL GOAL LESS THAN 160    Other male erectile dysfunction       No past medical history on file.    No past surgical history on file.    Family History   Problem Relation Age of Onset    Cancer Paternal Grandfather     Prostate Cancer Paternal Grandfather     Prostate Cancer Father        Social History     Tobacco Use    Smoking status: Never    Smokeless tobacco: Current    Tobacco comments:     Nicotine pouches    Substance Use Topics    Alcohol use: Yes     Comment: 3-6 beers a day or a case beer per week        History   Drug Use No       Current Outpatient Medications   Medication Sig Dispense Refill    tadalafil (CIALIS) 5 MG tablet Take 1 tablet (5 mg) by mouth daily. (Patient not taking: Reported on 9/9/2024) 60 tablet 4       Allergies   Allergen Reactions    Sulfa Antibiotics        Review of " "Systems:   10 point ROS negative other than what was listed in HPI    Physical Exam:  BP (!) 160/89   Pulse 101   Ht 1.774 m (5' 9.84\")   Wt 91.6 kg (202 lb)   SpO2 99%   BMI 29.12 kg/m      Constitutional- No acute distress, well nourished, non-toxic  Eyes: Anicteric, no injection.  PERRL  ENT:  Normocephalic, atraumatic, Nose midline, moist mucus membranes  Neck - supple, no LAD  Respiratory- Nonlabored breathing on room air  Cardiovascular - Extremities warm and well perfused  Abdomen - Soft, non-tender, +BS, no hepatosplenomegaly, no palpable masses  Groins - 2+ pulses bilaterally and no LAD, no masses  Rectal -external exam with perianal excoriation.  Digital rectal exam with no appreciable masses, good tone, guaiac negative.  Anoscope exam revealed enlarged internal hemorrhoids with no active bleeding, more prominent in left lateral quadrant.  Neuro - No focal neuro deficits, Alert and oriented x 3  Psych: Appropriate mood and affect  Musculoskeletal: Normal gait, symmetric strength.  FROM upper and lower extremities.  Skin: Warm, Dry    Assessment:  1. Driss Saenz is a 54 year old male who presents to surgery clinic for evaluation of anal bleeding and discomfort. These symptoms appear to be consistent with both pruritus ani andsymptomatic internal hemorrhoids. They are Grade I based on described symptoms. Explained to the patient the pathophysiology of hemorrhoids and that these are natural collections of submucosal sinusoids within the anorectum.  Symptomatic hemorrhoids can come from chronic irritation that causes vascular congestion and enlargement of hemorrhoids.  I explained that chronic irritation can also cause pruritus ani, leading to symptoms of itching and discomfort.  Explained to the patient the pathophysiology of pruritus ani. For both hemorrhoids and pruritus ani, I recommended treatment to improve anal hygiene, dietary modification, and protection of skin. This can be accomplished with " increasing fiber intake to 30 grams /day, increasing water intake to 2 L / day.  I counseled the patient to track and write down exactly how they are consuming and how to monitor the fiber content of foods through nutritional labels. This should help promote good colon health, bulk stools, and decrease stool residue on the perianal skin    For symptom management, I recommend keeping the area clean daily sitz baths. Zinc oxide cream to be prescribed to protect perianal skin.     I also explained that procedural intervention for hemorrhoids is an option, including possible banding or surgical excision. However, as he has not tried treatment for hemorrhoids in the past, I recommend starting with a non-operative approach.  If still symptomatic after 3 months, then we can further discuss surgical options for management of the hemorrhoids.  Patient expressed understanding and was amenable to the proposed plan.       Plan:   Increase fiber intake to 30 grams daily  Increase water intake to 2 L daily  Zinc oxide cream twice daily to anus for symptom control  Sitz baths twice daily  If symptoms still present, return to clinic in 3 months for re-evaluation      Chao Ordaz MD  9/9/2024 at 2:28 PM

## 2024-09-16 DIAGNOSIS — N52.9 VASCULOGENIC ERECTILE DYSFUNCTION, UNSPECIFIED VASCULOGENIC ERECTILE DYSFUNCTION TYPE: Primary | ICD-10-CM

## 2024-09-30 NOTE — PROGRESS NOTES
"  HEART CARE OUT-PATIENT CONSULTATON NOTE      [facility]  [phone]      Assessment/Recommendations   Assessment: 54 year old male with erectile dysfunction, syncope     Plan:  CAD risk assessment  By pooled cohort 10 year risk is intermediate (4.6%), no symptoms to suggest significant ischemia       -Calcium CT, if abnormal can add statin and pursue stress testing     Syncope   By description quite likely vagal when discussing vasectomy in urologist office       -CT as above, if abnormal can pursue rest echocardiogram to exclude structural causes     Hyperlipidemia   LDL = 103 with intermediate risk 4.6%      -Lifestyle      -Calcium scan as above, if elevated would add Lipitor 20mg         History of Present Illness/Subjective    Indication for Consult:  I was asked to see Driss Saenz by Christian Wells for erectile dysfunction, concern for underlying atherosclerotic cause, syncope      HPI: Driss Saenz is a 54 year old male with a history of erectile dysfunction but otherwise well who presents for evaluation of atherosclerotic risk assessment.    He reports he generally feels well, active without chest pain, dyspnea, orthopnea, PND.  He has long history of fainting at the sight of blood, needles, medical procedures.  He states when he has time to mentally prepare for a procedure he is OK, but if it is unexpected he has issues.  In the urologist office after discussing the vasectomy the \"verónica all of a sudden said drop your pants\" and started showing me \"we're going to cut here and here\" and suddenly he felt warm, flushed, nauseous, like he was in a tunnel and sounds were distorted.  He then blacked out briefly, awoke alert and aware.      I reviewed notes from PCP, urology prior to this visit.         Physical Examination  Past Cardiac History   Vitals: /80 (BP Location: Right arm, Patient Position: Sitting, Cuff Size: Adult Large)   Pulse 73   Resp 18   Ht 1.765 m (5' " "9.5\")   Wt 90.3 kg (199 lb)   SpO2 97%   BMI 28.97 kg/m    BMI= Body mass index is 28.97 kg/m .  Wt Readings from Last 3 Encounters:   10/09/24 90.3 kg (199 lb)   09/09/24 91.6 kg (202 lb)   08/29/24 91.1 kg (200 lb 12.8 oz)       General Appearance:   no distress, normal body habitus   ENT/Mouth: membranes moist, no oral lesions or bleeding gums.      EYES:  no scleral icterus, normal conjunctivae   Neck: no carotid bruits or thyromegaly   Chest/Lungs:   lungs are clear to auscultation, no rales or wheezing,  sternal scar, equal chest wall expansion    Cardiovascular:   Regular. Normal first and second heart sounds with no murmurs, rubs, or gallops; the carotid, radial and posterior tibial pulses are intact, Jugular venous pressure normal, No edema bilaterally    Abdomen:  no organomegaly, masses, bruits, or tenderness; bowel sounds are present   Extremities: no cyanosis or clubbing   Skin: no xanthelasma, warm.    Neurologic: normal  bilateral, no tremors           None    Most Recent Echocardiogram: None    Most Recent Stress Test: None    Most Recent Angiogram: None     Medical History  Family History Social History   Erectile dysfunction   Family History   Problem Relation Age of Onset    Cancer Paternal Grandfather     Prostate Cancer Paternal Grandfather     Prostate Cancer Father         Social History     Socioeconomic History    Marital status: Single     Spouse name: Not on file    Number of children: Not on file    Years of education: Not on file    Highest education level: Not on file   Occupational History    Not on file   Tobacco Use    Smoking status: Never    Smokeless tobacco: Current    Tobacco comments:     Nicotine pouches    Vaping Use    Vaping status: Never Used   Substance and Sexual Activity    Alcohol use: Yes     Comment: 3-6 beers a day or a case beer per week    Drug use: No    Sexual activity: Never   Other Topics Concern    Parent/sibling w/ CABG, MI or angioplasty before 65F " 55M? No   Social History Narrative    Not on file     Social Determinants of Health     Financial Resource Strain: High Risk (8/29/2024)    Financial Resource Strain     Within the past 12 months, have you or your family members you live with been unable to get utilities (heat, electricity) when it was really needed?: Yes   Food Insecurity: High Risk (8/29/2024)    Food Insecurity     Within the past 12 months, did you worry that your food would run out before you got money to buy more?: No     Within the past 12 months, did the food you bought just not last and you didn t have money to get more?: Yes   Transportation Needs: Low Risk  (8/29/2024)    Transportation Needs     Within the past 12 months, has lack of transportation kept you from medical appointments, getting your medicines, non-medical meetings or appointments, work, or from getting things that you need?: No   Physical Activity: Not on file   Stress: Not on file   Social Connections: Not on file   Interpersonal Safety: Low Risk  (8/29/2024)    Interpersonal Safety     Do you feel physically and emotionally safe where you currently live?: Yes     Within the past 12 months, have you been hit, slapped, kicked or otherwise physically hurt by someone?: No     Within the past 12 months, have you been humiliated or emotionally abused in other ways by your partner or ex-partner?: No   Housing Stability: Low Risk  (8/29/2024)    Housing Stability     Do you have housing? : Yes     Are you worried about losing your housing?: No           Medications  Allergies   Current Outpatient Medications   Medication Sig Dispense Refill    tadalafil (CIALIS) 5 MG tablet Take 1 tablet (5 mg) by mouth daily. 60 tablet 4       Allergies   Allergen Reactions    Sulfa Antibiotics           Lab Results    Chemistry/lipid CBC Cardiac Enzymes/BNP/TSH/INR   Recent Labs   Lab Test 08/29/24 0819   CHOL 170   HDL 55   *   TRIG 62     Recent Labs   Lab Test 08/29/24 0819   LDL  "103*     Recent Labs   Lab Test 08/29/24 0819      POTASSIUM 4.4   CHLORIDE 106   CO2 27   GLC 98   BUN 13.5   CR 1.06   GFRESTIMATED 85   RUSS 9.2     Recent Labs   Lab Test 08/29/24 0819   CR 1.06     Recent Labs   Lab Test 08/29/24 0819   A1C 5.1          Recent Labs   Lab Test 08/29/24 0819   WBC 5.6   HGB 14.3   HCT 43.8   MCV 92        Recent Labs   Lab Test 08/29/24 0819   HGB 14.3    No results for input(s): \"TROPONINI\" in the last 45184 hours.  No results for input(s): \"BNP\", \"NTBNPI\", \"NTBNP\" in the last 37944 hours.  Recent Labs   Lab Test 08/29/24 0819   TSH 1.33     No results for input(s): \"INR\" in the last 70844 hours.     Rose Vieira MD  Noninvasive Cardiologist   [facility]                                    "

## 2024-10-03 ENCOUNTER — TELEPHONE (OUTPATIENT)
Dept: UROLOGY | Facility: CLINIC | Age: 54
End: 2024-10-03
Payer: COMMERCIAL

## 2024-10-03 ENCOUNTER — HOSPITAL ENCOUNTER (OUTPATIENT)
Facility: CLINIC | Age: 54
End: 2024-10-03
Attending: STUDENT IN AN ORGANIZED HEALTH CARE EDUCATION/TRAINING PROGRAM | Admitting: STUDENT IN AN ORGANIZED HEALTH CARE EDUCATION/TRAINING PROGRAM
Payer: COMMERCIAL

## 2024-10-03 DIAGNOSIS — Z01.818 PREOPERATIVE EXAMINATION: Primary | ICD-10-CM

## 2024-10-03 PROBLEM — Z30.2 ENCOUNTER FOR VASECTOMY: Status: ACTIVE | Noted: 2024-09-06

## 2024-10-03 NOTE — TELEPHONE ENCOUNTER
[facility] scheduled a pre op for patient and wanted to see if it would work for him.  If so, and cardiology gives him clearance, would like to offer him surgery on 10/28.    Cheryl Darby LPN on 10/3/2024 at 10:41 AM

## 2024-10-09 ENCOUNTER — OFFICE VISIT (OUTPATIENT)
Dept: CARDIOLOGY | Facility: CLINIC | Age: 54
End: 2024-10-09
Attending: STUDENT IN AN ORGANIZED HEALTH CARE EDUCATION/TRAINING PROGRAM
Payer: COMMERCIAL

## 2024-10-09 VITALS
RESPIRATION RATE: 18 BRPM | OXYGEN SATURATION: 97 % | HEART RATE: 73 BPM | WEIGHT: 199 LBS | SYSTOLIC BLOOD PRESSURE: 134 MMHG | DIASTOLIC BLOOD PRESSURE: 80 MMHG | HEIGHT: 70 IN | BODY MASS INDEX: 28.49 KG/M2

## 2024-10-09 DIAGNOSIS — E78.5 HYPERLIPIDEMIA LDL GOAL <100: ICD-10-CM

## 2024-10-09 DIAGNOSIS — R55 VASOVAGAL SYNCOPE: Primary | ICD-10-CM

## 2024-10-09 DIAGNOSIS — N52.9 VASCULOGENIC ERECTILE DYSFUNCTION, UNSPECIFIED VASCULOGENIC ERECTILE DYSFUNCTION TYPE: ICD-10-CM

## 2024-10-09 PROCEDURE — 99204 OFFICE O/P NEW MOD 45 MIN: CPT | Performed by: INTERNAL MEDICINE

## 2024-10-09 NOTE — LETTER
"10/9/2024    Kami Champagne, NP  [address] [address]    RE: Driss Saenz       Dear Colleague,     I had the pleasure of seeing Driss Saenz in the [facility].    HEART CARE OUT-PATIENT CONSULTATON NOTE      [facility]  [phone]      Assessment/Recommendations   Assessment: 54 year old male with erectile dysfunction, syncope     Plan:  CAD risk assessment  By pooled cohort 10 year risk is intermediate (4.6%), no symptoms to suggest significant ischemia       -Calcium CT, if abnormal can add statin and pursue stress testing     Syncope   By description quite likely vagal when discussing vasectomy in urologist office       -CT as above, if abnormal can pursue rest echocardiogram to exclude structural causes     Hyperlipidemia   LDL = 103 with intermediate risk 4.6%      -Lifestyle      -Calcium scan as above, if elevated would add Lipitor 20mg         History of Present Illness/Subjective    Indication for Consult:  I was asked to see Driss Saenz by Christian Wells for erectile dysfunction, concern for underlying atherosclerotic cause, syncope      HPI: Driss Saenz is a 54 year old male with a history of erectile dysfunction but otherwise well who presents for evaluation of atherosclerotic risk assessment.    He reports he generally feels well, active without chest pain, dyspnea, orthopnea, PND.  He has long history of fainting at the sight of blood, needles, medical procedures.  He states when he has time to mentally prepare for a procedure he is OK, but if it is unexpected he has issues.  In the urologist office after discussing the vasectomy the \"verónica all of a sudden said drop your pants\" and started showing me \"we're going to cut here and here\" and suddenly he felt warm, flushed, nauseous, like he was in a tunnel and sounds were distorted.  He then blacked out briefly, awoke alert and aware.      I reviewed notes from PCP, urology prior to " "this visit.         Physical Examination  Past Cardiac History   Vitals: /80 (BP Location: Right arm, Patient Position: Sitting, Cuff Size: Adult Large)   Pulse 73   Resp 18   Ht 1.765 m (5' 9.5\")   Wt 90.3 kg (199 lb)   SpO2 97%   BMI 28.97 kg/m    BMI= Body mass index is 28.97 kg/m .  Wt Readings from Last 3 Encounters:   10/09/24 90.3 kg (199 lb)   09/09/24 91.6 kg (202 lb)   08/29/24 91.1 kg (200 lb 12.8 oz)       General Appearance:   no distress, normal body habitus   ENT/Mouth: membranes moist, no oral lesions or bleeding gums.      EYES:  no scleral icterus, normal conjunctivae   Neck: no carotid bruits or thyromegaly   Chest/Lungs:   lungs are clear to auscultation, no rales or wheezing,  sternal scar, equal chest wall expansion    Cardiovascular:   Regular. Normal first and second heart sounds with no murmurs, rubs, or gallops; the carotid, radial and posterior tibial pulses are intact, Jugular venous pressure normal, No edema bilaterally    Abdomen:  no organomegaly, masses, bruits, or tenderness; bowel sounds are present   Extremities: no cyanosis or clubbing   Skin: no xanthelasma, warm.    Neurologic: normal  bilateral, no tremors           None    Most Recent Echocardiogram: None    Most Recent Stress Test: None    Most Recent Angiogram: None     Medical History  Family History Social History   Erectile dysfunction   Family History   Problem Relation Age of Onset     Cancer Paternal Grandfather      Prostate Cancer Paternal Grandfather      Prostate Cancer Father         Social History     Socioeconomic History     Marital status: Single     Spouse name: Not on file     Number of children: Not on file     Years of education: Not on file     Highest education level: Not on file   Occupational History     Not on file   Tobacco Use     Smoking status: Never     Smokeless tobacco: Current     Tobacco comments:     Nicotine pouches    Vaping Use     Vaping status: Never Used   Substance and " Sexual Activity     Alcohol use: Yes     Comment: 3-6 beers a day or a case beer per week     Drug use: No     Sexual activity: Never   Other Topics Concern     Parent/sibling w/ CABG, MI or angioplasty before 65F 55M? No   Social History Narrative     Not on file     Social Determinants of Health     Financial Resource Strain: High Risk (8/29/2024)    Financial Resource Strain      Within the past 12 months, have you or your family members you live with been unable to get utilities (heat, electricity) when it was really needed?: Yes   Food Insecurity: High Risk (8/29/2024)    Food Insecurity      Within the past 12 months, did you worry that your food would run out before you got money to buy more?: No      Within the past 12 months, did the food you bought just not last and you didn t have money to get more?: Yes   Transportation Needs: Low Risk  (8/29/2024)    Transportation Needs      Within the past 12 months, has lack of transportation kept you from medical appointments, getting your medicines, non-medical meetings or appointments, work, or from getting things that you need?: No   Physical Activity: Not on file   Stress: Not on file   Social Connections: Not on file   Interpersonal Safety: Low Risk  (8/29/2024)    Interpersonal Safety      Do you feel physically and emotionally safe where you currently live?: Yes      Within the past 12 months, have you been hit, slapped, kicked or otherwise physically hurt by someone?: No      Within the past 12 months, have you been humiliated or emotionally abused in other ways by your partner or ex-partner?: No   Housing Stability: Low Risk  (8/29/2024)    Housing Stability      Do you have housing? : Yes      Are you worried about losing your housing?: No           Medications  Allergies   Current Outpatient Medications   Medication Sig Dispense Refill     tadalafil (CIALIS) 5 MG tablet Take 1 tablet (5 mg) by mouth daily. 60 tablet 4       Allergies   Allergen Reactions  "    Sulfa Antibiotics           Lab Results    Chemistry/lipid CBC Cardiac Enzymes/BNP/TSH/INR   Recent Labs   Lab Test 08/29/24 0819   CHOL 170   HDL 55   *   TRIG 62     Recent Labs   Lab Test 08/29/24 0819   *     Recent Labs   Lab Test 08/29/24 0819      POTASSIUM 4.4   CHLORIDE 106   CO2 27   GLC 98   BUN 13.5   CR 1.06   GFRESTIMATED 85   RUSS 9.2     Recent Labs   Lab Test 08/29/24 0819   CR 1.06     Recent Labs   Lab Test 08/29/24 0819   A1C 5.1          Recent Labs   Lab Test 08/29/24 0819   WBC 5.6   HGB 14.3   HCT 43.8   MCV 92        Recent Labs   Lab Test 08/29/24 0819   HGB 14.3    No results for input(s): \"TROPONINI\" in the last 38863 hours.  No results for input(s): \"BNP\", \"NTBNPI\", \"NTBNP\" in the last 14633 hours.  Recent Labs   Lab Test 08/29/24 0819   TSH 1.33     No results for input(s): \"INR\" in the last 37709 hours.     Rose Vieira MD  Noninvasive Cardiologist   [facility]                                        Thank you for allowing me to participate in the care of your patient.      Sincerely,     Rose Vieira MD     [facility]  cc:   Christian Salomon MD  [address] [place] [address]      "

## 2024-10-09 NOTE — PATIENT INSTRUCTIONS
Your episode is likely a vasovagal event.  Normally when we are faced with something unpleasant our heart rate and blood pressure go up.  In people with vagal episodes the heart rate and blood pressure decrease in response to something unpleasant which results in fainting.  This is not an indication of underlying heart issues.      Erectile dysfunction can be associated with plaque build up in arteries.  Would advise a CT scan to see how much calcium build up you have in your heart arteries.  If this is more than expected can have implications for additional testing such as stress testing, or medication changes such as cholesterol meds.

## 2024-10-23 ENCOUNTER — ANCILLARY ORDERS (OUTPATIENT)
Dept: CARDIOLOGY | Facility: CLINIC | Age: 54
End: 2024-10-23

## 2024-10-23 DIAGNOSIS — E78.5 HYPERLIPIDEMIA LDL GOAL <100: Primary | ICD-10-CM

## 2024-10-30 ENCOUNTER — HOSPITAL ENCOUNTER (OUTPATIENT)
Dept: CT IMAGING | Facility: CLINIC | Age: 54
Discharge: HOME | End: 2024-10-30
Attending: INTERNAL MEDICINE | Admitting: INTERNAL MEDICINE
Payer: COMMERCIAL

## 2024-10-30 DIAGNOSIS — E78.5 HYPERLIPIDEMIA LDL GOAL <100: ICD-10-CM

## 2024-10-30 PROCEDURE — 75571 CT HRT W/O DYE W/CA TEST: CPT

## 2024-11-04 ENCOUNTER — OFFICE VISIT (OUTPATIENT)
Dept: FAMILY MEDICINE | Facility: CLINIC | Age: 54
End: 2024-11-04
Payer: COMMERCIAL

## 2024-11-04 VITALS
HEART RATE: 87 BPM | DIASTOLIC BLOOD PRESSURE: 84 MMHG | WEIGHT: 201.9 LBS | RESPIRATION RATE: 16 BRPM | BODY MASS INDEX: 28.9 KG/M2 | HEIGHT: 70 IN | OXYGEN SATURATION: 97 % | TEMPERATURE: 97.7 F | SYSTOLIC BLOOD PRESSURE: 138 MMHG

## 2024-11-04 DIAGNOSIS — Z01.818 PREOP GENERAL PHYSICAL EXAM: Primary | ICD-10-CM

## 2024-11-04 DIAGNOSIS — Z11.59 NEED FOR HEPATITIS C SCREENING TEST: ICD-10-CM

## 2024-11-04 DIAGNOSIS — Z30.2 ENCOUNTER FOR STERILIZATION: ICD-10-CM

## 2024-11-04 DIAGNOSIS — Z11.4 SCREENING FOR HIV (HUMAN IMMUNODEFICIENCY VIRUS): ICD-10-CM

## 2024-11-04 DIAGNOSIS — F41.0 ANXIETY ATTACK: ICD-10-CM

## 2024-11-04 PROCEDURE — 99214 OFFICE O/P EST MOD 30 MIN: CPT | Performed by: FAMILY MEDICINE

## 2024-11-04 ASSESSMENT — PAIN SCALES - GENERAL: PAINLEVEL_OUTOF10: NO PAIN (0)

## 2024-11-04 NOTE — PATIENT INSTRUCTIONS
How to Take Your Medication Before Surgery  Preoperative Medication Instructions   Antiplatelet or Anticoagulation Medication Instructions   - Patient is on no antiplatelet or anticoagulation medications.    Additional Medication Instructions  Patient is on no additional chronic medications       Patient Education   Preparing for Your Surgery  For Adults  Getting started  In most cases, a nurse will call to review your health history and instructions. They will give you an arrival time based on your scheduled surgery time. Please be ready to share:  Your doctor's clinic name and phone number  Your medical, surgical, and anesthesia history  A list of allergies and sensitivities  A list of medicines, including herbal treatments and over-the-counter drugs  Whether the patient has a legal guardian (ask how to send us the papers in advance)  Note: You may not receive a call if you were seen at our PAC (Preoperative Assessment Center).  Please tell us if you're pregnant--or if there's any chance you might be pregnant. Some surgeries may injure a fetus (unborn baby), so they require a pregnancy test. Surgeries that are safe for a fetus don't always need a test, and you can choose whether to have one.   Preparing for surgery  Within 10 to 30 days of surgery: Have a pre-op exam (sometimes called an H&P, or History and Physical). This can be done at a clinic or pre-operative center.  If you're having a , you may not need this exam. Talk to your care team.  At your pre-op exam, talk to your care team about all medicines you take. (This includes CBD oil and any drugs, such as THC, marijuana, and other forms of cannabis.) If you need to stop any medicine before surgery, ask when to start taking it again.  This is for your safety. Many medicines and drugs can make you bleed too much during surgery. Some change how well surgery (anesthesia) drugs work.  Call your insurance company to let them know you're having surgery.  (If you don't have insurance, call [phone].)  Call your clinic if there's any change in your health. This includes a scrape or scratch near the surgery site, or any signs of a cold (sore throat, runny nose, cough, rash, fever).  Eating and drinking guidelines  For your safety: Unless your surgeon tells you otherwise, follow the guidelines below.  Eat and drink as normal until 8 hours before you arrive for surgery. After that, no food or milk. You can spit out gum when you arrive.  Drink clear liquids until 2 hours before you arrive. These are liquids you can see through, like water, Gatorade, and Propel Water. They also include plain black coffee and tea (no cream or milk).  No alcohol for 24 hours before you arrive. The night before surgery, stop any drinks that contain THC.  If your care team tells you to take medicine on the morning of surgery, it's okay to take it with a sip of water. No other medicines or drugs are allowed (including CBD oil)--follow your care team's instructions.  If you have questions the day of surgery, call your hospital or surgery center.   Preventing infection  Shower or bathe the night before and the morning of surgery. Follow the instructions your clinic gave you. (If no instructions, use regular soap.)  Don't shave or clip hair near your surgery site. We'll remove the hair if needed.  Don't smoke or vape the morning of surgery. No chewing tobacco for 6 hours before you arrive. A nicotine patch is okay. You may spit out nicotine gum when you arrive.  For some surgeries, the surgeon will tell you to fully quit smoking and nicotine.  We will make every effort to keep you safe from infection. We will:  Clean our hands often with soap and water (or an alcohol-based hand rub).  Clean the skin at your surgery site with a special soap that kills germs.  Give you a special gown to keep you warm. (Cold raises the risk of infection.)  Wear hair covers, masks, gowns, and gloves during  surgery.  Give antibiotic medicine, if prescribed. Not all surgeries need this medicine.  What to bring on the day of surgery  Photo ID and insurance card  Copy of your health care directive, if you have one  Glasses and hearing aids (bring cases)  You can't wear contacts during surgery  Inhaler and eye drops, if you use them (tell us about these when you arrive)  CPAP machine or breathing device, if you use them  A few personal items, if spending the night  If you have . . .  A pacemaker, ICD (cardiac defibrillator), or other implant: Bring the ID card.  An implanted stimulator: Bring the remote control.  A legal guardian: Bring a copy of the certified (court-stamped) guardianship papers.  Please remove any jewelry, including body piercings. Leave jewelry and other valuables at home.  If you're going home the day of surgery  You must have a responsible adult drive you home. They should stay with you overnight as well.  If you don't have someone to stay with you, and you aren't safe to go home alone, we may keep you overnight. Insurance often won't pay for this.  After surgery  If it's hard to control your pain or you need more pain medicine, please call your surgeon's office.  Questions?   If you have any questions for your care team, list them here:   ____________________________________________________________________________________________________________________________________________________________________________________________________________________________________________________________  For informational purposes only. Not to replace the advice of your health care provider. Copyright   2003, 2019 [facility]. All rights reserved. Clinically reviewed by Nicholas Fraser MD. Share0 [id number] - REV 08/24.

## 2024-11-04 NOTE — PROGRESS NOTES
Preoperative Evaluation  [facility]  [address]  [facility] [address]  Phone: [phone]  Primary Provider: Kami Champagne NP  Pre-op Performing Provider: Jai Maldonado MD  Nov 4, 2024 11/4/2024   Surgical Information   What procedure is being done? vastectomy    Facility or Hospital where procedure/surgery will be performed: [place]    Who is doing the procedure / surgery?  Christian Salomon MD    Date of surgery / procedure: monday November 18,2024    Time of surgery / procedure: 3pm    Where do you plan to recover after surgery? at home with family        Patient-reported     Fax number for surgical facility: Note does not need to be faxed, will be available electronically in Epic.    Assessment & Plan     The proposed surgical procedure is considered LOW risk.    (Z30.2) Encounter for sterilization  Okay for procedure     (F41.0) Anxiety attack  Okay for procedure      - No identified additional risk factors other than previously addressed         Recommendation  Approval given to proceed with proposed procedure, without further diagnostic evaluation.    Stephie Naqvi is a 54 year old, presenting for the following:  Pre-Op Exam          11/4/2024     4:13 PM   Additional Questions   Roomed by Lani Bradley CMA     HPI related to upcoming procedure:           11/4/2024   Pre-Op Questionnaire   Have you ever had a heart attack or stroke? No    Have you ever had surgery on your heart or blood vessels, such as a stent placement, a coronary artery bypass, or surgery on an artery in your head, neck, heart, or legs? No    Do you have chest pain with activity? No    Do you have a history of heart failure? No    Do you currently have a cold, bronchitis or symptoms of other infection? No    Do you have a cough, shortness of breath, or wheezing? No    Do you or anyone in your family have previous history of blood clots? No    Do you or does anyone in your family have  a serious bleeding problem such as prolonged bleeding following surgeries or cuts? No    Have you ever had problems with anemia or been told to take iron pills? No    Have you had any abnormal blood loss such as black, tarry or bloody stools? No    Have you ever had a blood transfusion? No    Are you willing to have a blood transfusion if it is medically needed before, during, or after your surgery? Yes    Have you or any of your relatives ever had problems with anesthesia? No    Do you have sleep apnea, excessive snoring or daytime drowsiness? No    Do you have any artifical heart valves or other implanted medical devices like a pacemaker, defibrillator, or continuous glucose monitor? No    Do you have artificial joints? No    Are you allergic to latex? No        Patient-reported     Health Care Directive  Patient does not have a Health Care Directive: Discussed advance care planning with patient; information given to patient to review.    Preoperative Review of    reviewed - no record of controlled substances prescribed.          Patient Active Problem List    Diagnosis Date Noted    Encounter for vasectomy 09/06/2024     Priority: Medium    Other male erectile dysfunction 08/29/2024     Priority: Medium    CARDIOVASCULAR SCREENING; LDL GOAL LESS THAN 160 10/31/2010     Priority: Medium      History reviewed. No pertinent past medical history.  History reviewed. No pertinent surgical history.  Current Outpatient Medications   Medication Sig Dispense Refill    tadalafil (CIALIS) 5 MG tablet Take 1 tablet (5 mg) by mouth daily. 60 tablet 4       Allergies   Allergen Reactions    Sulfa Antibiotics         Social History     Tobacco Use    Smoking status: Never    Smokeless tobacco: Current    Tobacco comments:     Nicotine pouches    Substance Use Topics    Alcohol use: Yes     Comment: 3-6 beers a day or a case beer per week     Family History   Problem Relation Age of Onset    Cancer Paternal Grandfather      "Prostate Cancer Paternal Grandfather     Prostate Cancer Father      History   Drug Use No             Review of Systems  Constitutional, HEENT, cardiovascular, pulmonary, gi and gu systems are negative, except as otherwise noted.    Objective    /84   Pulse 87   Temp 97.7  F (36.5  C) (Tympanic)   Resp 16   Ht 1.765 m (5' 9.5\")   Wt 91.6 kg (201 lb 14.4 oz)   SpO2 97%   BMI 29.39 kg/m     Estimated body mass index is 29.39 kg/m  as calculated from the following:    Height as of this encounter: 1.765 m (5' 9.5\").    Weight as of this encounter: 91.6 kg (201 lb 14.4 oz).    Physical Exam  GENERAL: alert and no distress  NECK: no adenopathy, no asymmetry, masses, or scars  RESP: lungs clear to auscultation - no rales, rhonchi or wheezes  CV: regular rate and rhythm, normal S1 S2, no S3 or S4, no murmur, click or rub, no peripheral edema  ABDOMEN: soft, nontender, no hepatosplenomegaly, no masses and bowel sounds normal  MS: no gross musculoskeletal defects noted, no edema    Recent Labs   Lab Test 08/29/24  0819   HGB 14.3         POTASSIUM 4.4   CR 1.06   A1C 5.1        Diagnostics  No labs were ordered during this visit.   No EKG required for low risk surgery (cataract, skin procedure, breast biopsy, etc).    Revised Cardiac Risk Index (RCRI)  The patient has the following serious cardiovascular risks for perioperative complications:   - No serious cardiac risks = 0 points     RCRI Interpretation: 0 points: Class I (very low risk - 0.4% complication rate)       Signed Electronically by: Jai Maldonado MD  A copy of this evaluation report is provided to the requesting physician.         "

## 2024-11-15 ENCOUNTER — ANESTHESIA EVENT (OUTPATIENT)
Dept: SURGERY | Facility: CLINIC | Age: 54
End: 2024-11-15
Payer: COMMERCIAL

## 2024-11-15 NOTE — ANESTHESIA PREPROCEDURE EVALUATION
Anesthesia Pre-Procedure Evaluation    Patient: Driss CHI Stone   MRN: [id number] : 1970        Procedure : Procedure(s):  VASECTOMY          History reviewed. No pertinent past medical history.   History reviewed. No pertinent surgical history.   Allergies   Allergen Reactions     Sulfa Antibiotics       Social History     Tobacco Use     Smoking status: Never     Smokeless tobacco: Current     Tobacco comments:     Nicotine pouches    Substance Use Topics     Alcohol use: Yes     Comment: 3-6 beers a day or a case beer per week      Wt Readings from Last 1 Encounters:   24 91.6 kg (201 lb 14.4 oz)        Anesthesia Evaluation   Pt has not had prior anesthetic         ROS/MED HX  ENT/Pulmonary:     (+)                tobacco use, Current use,                       Neurologic:  - neg neurologic ROS     Cardiovascular:     (+) Dyslipidemia - -   -  - -                                      METS/Exercise Tolerance:     Hematologic:  - neg hematologic  ROS     Musculoskeletal:   (+)  arthritis,             GI/Hepatic:  - neg GI/hepatic ROS   (+)             liver disease,       Renal/Genitourinary:       Endo:  - neg endo ROS     Psychiatric/Substance Use:     (+) psychiatric history anxiety alcohol abuse (3-6 beers/day)      Infectious Disease:  - neg infectious disease ROS     Malignancy:  - neg malignancy ROS     Other:  - neg other ROS          Physical Exam    Airway        Mallampati: II   TM distance: > 3 FB   Neck ROM: full   Mouth opening: > 3 cm    Respiratory Devices and Support         Dental    unable to assess        Cardiovascular   cardiovascular exam normal          Pulmonary   pulmonary exam normal            OUTSIDE LABS:  CBC:   Lab Results   Component Value Date    WBC 5.6 2024    WBC 8.3 10/10/2012    HGB 14.3 2024    HGB 15.2 10/10/2012    HCT 43.8 2024    HCT 45.4 10/10/2012     2024     10/10/2012     BMP:   Lab Results   Component Value Date     " 08/29/2024     10/10/2012    POTASSIUM 4.4 08/29/2024    POTASSIUM 3.6 10/10/2012    CHLORIDE 106 08/29/2024    CHLORIDE 101 10/10/2012    CO2 27 08/29/2024    CO2 27 10/10/2012    BUN 13.5 08/29/2024    BUN 9 10/10/2012    CR 1.06 08/29/2024    CR 0.98 10/10/2012    GLC 98 08/29/2024    GLC 64 10/10/2012     COAGS: No results found for: \"PTT\", \"INR\", \"FIBR\"  POC: No results found for: \"BGM\", \"HCG\", \"HCGS\"  HEPATIC:   Lab Results   Component Value Date    ALBUMIN 4.3 08/29/2024    PROTTOTAL 7.3 08/29/2024    ALT 29 08/29/2024    AST 26 08/29/2024    ALKPHOS 77 08/29/2024    BILITOTAL 0.4 08/29/2024     OTHER:   Lab Results   Component Value Date    A1C 5.1 08/29/2024    RUSS 9.2 08/29/2024    TSH 1.33 08/29/2024       Anesthesia Plan    ASA Status:  2       Anesthesia Type: General.   Induction: Intravenous.   Maintenance: TIVA.        Consents    Anesthesia Plan(s) and associated risks, benefits, and realistic alternatives discussed. Questions answered and patient/representative(s) expressed understanding.     - Discussed: Risks, Benefits and Alternatives for BOTH SEDATION and the PROCEDURE were discussed     - Discussed with:  Patient            Postoperative Care    Pain management: IV analgesics.   PONV prophylaxis: Ondansetron (or other 5HT-3)     Comments:             Hernesto Breaux, LAINEY CRNA    I have reviewed the pertinent notes and labs in the chart from the past 30 days and (re)examined the patient.  Any updates or changes from those notes are reflected in this note.                         # Overweight: Estimated body mass index is 29.39 kg/m  as calculated from the following:    Height as of 11/4/24: 1.765 m (5' 9.5\").    Weight as of 11/4/24: 91.6 kg (201 lb 14.4 oz).             "

## 2024-11-17 NOTE — H&P
Urology Preoperative H&P    Patient information was obtained from patient and medical chart review.  History/Exam limitations: none.    Driss Saenz is a 54 year old male, here today to undergo Vasectomy.    Patient with Vasovagal response to exam in clinic. Cleared by medicine for procedure.     Denies significant changes to hx since last visit.    Patient Active Problem List    Diagnosis Date Noted    Encounter for vasectomy 09/06/2024     Priority: Medium    Other male erectile dysfunction 08/29/2024     Priority: Medium    CARDIOVASCULAR SCREENING; LDL GOAL LESS THAN 160 10/31/2010     Priority: Medium     History reviewed. No pertinent past medical history.   History reviewed. No pertinent surgical history.  Allergies   Allergen Reactions    Sulfa Antibiotics       Social History     Tobacco Use    Smoking status: Never    Smokeless tobacco: Current    Tobacco comments:     Nicotine pouches    Substance Use Topics    Alcohol use: Yes     Comment: 3-6 beers a day or a case beer per week      Family History   Problem Relation Age of Onset    Cancer Paternal Grandfather     Prostate Cancer Paternal Grandfather     Prostate Cancer Father         Review of Systems  Consitutional: denies fevers, chills  Allergy: as indicated on chart   Respiratory: denies wheezing or shortness of breath   Cardiovascular: denies chest pain or palpitations  : denies changes in urinary symptoms since previous visit    Objective:     There were no vitals taken for this visit.    General Appearance: NAD  Head/Neck: Normocephalic, atraumatic  Eyes: Anicteric, sclera white     Lungs/Chest: Respirations unlabored  Heart: Extremities warm and well-perfused  Abdomen: Soft, non-distended.   Extremities: Extremities without evidence of trauma  Skin:  No jaundice  Neurologic: GCS 15    Data Review: Labs    Reviewed    Images    Reviewed    Assessment:     Pre-Op Diagnosis Codes:      * Encounter for vasectomy [Z30.2]    Plan:     Urine  culture not indicated    Procedure(s):  VASECTOMY    Christian Salomon MD

## 2024-11-18 ENCOUNTER — ANESTHESIA (OUTPATIENT)
Dept: SURGERY | Facility: CLINIC | Age: 54
End: 2024-11-18
Payer: COMMERCIAL

## 2024-11-18 ENCOUNTER — HOSPITAL ENCOUNTER (OUTPATIENT)
Facility: CLINIC | Age: 54
Discharge: HOME | End: 2024-11-18
Attending: STUDENT IN AN ORGANIZED HEALTH CARE EDUCATION/TRAINING PROGRAM | Admitting: STUDENT IN AN ORGANIZED HEALTH CARE EDUCATION/TRAINING PROGRAM
Payer: COMMERCIAL

## 2024-11-18 VITALS
DIASTOLIC BLOOD PRESSURE: 83 MMHG | SYSTOLIC BLOOD PRESSURE: 122 MMHG | TEMPERATURE: 97.1 F | BODY MASS INDEX: 28.77 KG/M2 | HEART RATE: 60 BPM | WEIGHT: 201 LBS | RESPIRATION RATE: 14 BRPM | OXYGEN SATURATION: 98 % | HEIGHT: 70 IN

## 2024-11-18 DIAGNOSIS — Z30.2 ENCOUNTER FOR VASECTOMY: Primary | ICD-10-CM

## 2024-11-18 PROCEDURE — 250N000011 HC RX IP 250 OP 636: Performed by: STUDENT IN AN ORGANIZED HEALTH CARE EDUCATION/TRAINING PROGRAM

## 2024-11-18 PROCEDURE — 272N000001 HC OR GENERAL SUPPLY STERILE: Performed by: STUDENT IN AN ORGANIZED HEALTH CARE EDUCATION/TRAINING PROGRAM

## 2024-11-18 PROCEDURE — 88302 TISSUE EXAM BY PATHOLOGIST: CPT | Mod: 26 | Performed by: PATHOLOGY

## 2024-11-18 PROCEDURE — 250N000011 HC RX IP 250 OP 636: Performed by: NURSE ANESTHETIST, CERTIFIED REGISTERED

## 2024-11-18 PROCEDURE — 258N000003 HC RX IP 258 OP 636

## 2024-11-18 PROCEDURE — 360N000074 HC SURGERY LEVEL 1, PER MIN: Performed by: STUDENT IN AN ORGANIZED HEALTH CARE EDUCATION/TRAINING PROGRAM

## 2024-11-18 PROCEDURE — 999N000141 HC STATISTIC PRE-PROCEDURE NURSING ASSESSMENT: Performed by: STUDENT IN AN ORGANIZED HEALTH CARE EDUCATION/TRAINING PROGRAM

## 2024-11-18 PROCEDURE — 250N000009 HC RX 250: Performed by: NURSE ANESTHETIST, CERTIFIED REGISTERED

## 2024-11-18 PROCEDURE — 370N000017 HC ANESTHESIA TECHNICAL FEE, PER MIN: Performed by: STUDENT IN AN ORGANIZED HEALTH CARE EDUCATION/TRAINING PROGRAM

## 2024-11-18 PROCEDURE — 88302 TISSUE EXAM BY PATHOLOGIST: CPT | Mod: TC | Performed by: STUDENT IN AN ORGANIZED HEALTH CARE EDUCATION/TRAINING PROGRAM

## 2024-11-18 PROCEDURE — 250N000009 HC RX 250: Performed by: STUDENT IN AN ORGANIZED HEALTH CARE EDUCATION/TRAINING PROGRAM

## 2024-11-18 PROCEDURE — 55250 REMOVAL OF SPERM DUCT(S): CPT | Performed by: STUDENT IN AN ORGANIZED HEALTH CARE EDUCATION/TRAINING PROGRAM

## 2024-11-18 PROCEDURE — 710N000012 HC RECOVERY PHASE 2, PER MINUTE: Performed by: STUDENT IN AN ORGANIZED HEALTH CARE EDUCATION/TRAINING PROGRAM

## 2024-11-18 RX ORDER — FENTANYL CITRATE 50 UG/ML
INJECTION, SOLUTION INTRAMUSCULAR; INTRAVENOUS PRN
Status: DISCONTINUED | OUTPATIENT
Start: 2024-11-18 | End: 2024-11-18

## 2024-11-18 RX ORDER — ONDANSETRON 2 MG/ML
4 INJECTION INTRAMUSCULAR; INTRAVENOUS EVERY 30 MIN PRN
Status: DISCONTINUED | OUTPATIENT
Start: 2024-11-18 | End: 2024-11-18 | Stop reason: HOSPADM

## 2024-11-18 RX ORDER — SODIUM CHLORIDE, SODIUM LACTATE, POTASSIUM CHLORIDE, CALCIUM CHLORIDE 600; 310; 30; 20 MG/100ML; MG/100ML; MG/100ML; MG/100ML
INJECTION, SOLUTION INTRAVENOUS CONTINUOUS
Status: DISCONTINUED | OUTPATIENT
Start: 2024-11-18 | End: 2024-11-18 | Stop reason: HOSPADM

## 2024-11-18 RX ORDER — ONDANSETRON 2 MG/ML
INJECTION INTRAMUSCULAR; INTRAVENOUS PRN
Status: DISCONTINUED | OUTPATIENT
Start: 2024-11-18 | End: 2024-11-18

## 2024-11-18 RX ORDER — BACITRACIN ZINC 500 [USP'U]/G
OINTMENT TOPICAL PRN
Status: DISCONTINUED | OUTPATIENT
Start: 2024-11-18 | End: 2024-11-18 | Stop reason: HOSPADM

## 2024-11-18 RX ORDER — CEFAZOLIN SODIUM/WATER 2 G/20 ML
2 SYRINGE (ML) INTRAVENOUS
Status: COMPLETED | OUTPATIENT
Start: 2024-11-18 | End: 2024-11-18

## 2024-11-18 RX ORDER — DEXAMETHASONE SODIUM PHOSPHATE 4 MG/ML
4 INJECTION, SOLUTION INTRA-ARTICULAR; INTRALESIONAL; INTRAMUSCULAR; INTRAVENOUS; SOFT TISSUE
Status: DISCONTINUED | OUTPATIENT
Start: 2024-11-18 | End: 2024-11-18 | Stop reason: HOSPADM

## 2024-11-18 RX ORDER — OXYCODONE HYDROCHLORIDE 5 MG/1
10 TABLET ORAL
Status: DISCONTINUED | OUTPATIENT
Start: 2024-11-18 | End: 2024-11-18 | Stop reason: HOSPADM

## 2024-11-18 RX ORDER — LIDOCAINE 40 MG/G
CREAM TOPICAL
Status: DISCONTINUED | OUTPATIENT
Start: 2024-11-18 | End: 2024-11-18 | Stop reason: HOSPADM

## 2024-11-18 RX ORDER — LIDOCAINE HYDROCHLORIDE 20 MG/ML
INJECTION, SOLUTION INFILTRATION; PERINEURAL PRN
Status: DISCONTINUED | OUTPATIENT
Start: 2024-11-18 | End: 2024-11-18

## 2024-11-18 RX ORDER — DEXAMETHASONE SODIUM PHOSPHATE 4 MG/ML
INJECTION, SOLUTION INTRA-ARTICULAR; INTRALESIONAL; INTRAMUSCULAR; INTRAVENOUS; SOFT TISSUE PRN
Status: DISCONTINUED | OUTPATIENT
Start: 2024-11-18 | End: 2024-11-18

## 2024-11-18 RX ORDER — BUPIVACAINE HYDROCHLORIDE 5 MG/ML
INJECTION, SOLUTION PERINEURAL PRN
Status: DISCONTINUED | OUTPATIENT
Start: 2024-11-18 | End: 2024-11-18 | Stop reason: HOSPADM

## 2024-11-18 RX ORDER — ONDANSETRON 4 MG/1
4 TABLET, ORALLY DISINTEGRATING ORAL EVERY 30 MIN PRN
Status: DISCONTINUED | OUTPATIENT
Start: 2024-11-18 | End: 2024-11-18 | Stop reason: HOSPADM

## 2024-11-18 RX ORDER — OXYCODONE HYDROCHLORIDE 5 MG/1
5 TABLET ORAL
Status: DISCONTINUED | OUTPATIENT
Start: 2024-11-18 | End: 2024-11-18 | Stop reason: HOSPADM

## 2024-11-18 RX ORDER — PROPOFOL 10 MG/ML
INJECTION, EMULSION INTRAVENOUS PRN
Status: DISCONTINUED | OUTPATIENT
Start: 2024-11-18 | End: 2024-11-18

## 2024-11-18 RX ORDER — NALOXONE HYDROCHLORIDE 0.4 MG/ML
0.1 INJECTION, SOLUTION INTRAMUSCULAR; INTRAVENOUS; SUBCUTANEOUS
Status: DISCONTINUED | OUTPATIENT
Start: 2024-11-18 | End: 2024-11-18 | Stop reason: HOSPADM

## 2024-11-18 RX ORDER — FENTANYL CITRATE 50 UG/ML
25 INJECTION, SOLUTION INTRAMUSCULAR; INTRAVENOUS
Status: DISCONTINUED | OUTPATIENT
Start: 2024-11-18 | End: 2024-11-18 | Stop reason: HOSPADM

## 2024-11-18 RX ORDER — PROPOFOL 10 MG/ML
INJECTION, EMULSION INTRAVENOUS CONTINUOUS PRN
Status: DISCONTINUED | OUTPATIENT
Start: 2024-11-18 | End: 2024-11-18

## 2024-11-18 RX ORDER — KETOROLAC TROMETHAMINE 30 MG/ML
INJECTION, SOLUTION INTRAMUSCULAR; INTRAVENOUS PRN
Status: DISCONTINUED | OUTPATIENT
Start: 2024-11-18 | End: 2024-11-18

## 2024-11-18 RX ADMIN — KETOROLAC TROMETHAMINE 30 MG: 30 INJECTION, SOLUTION INTRAMUSCULAR at 16:03

## 2024-11-18 RX ADMIN — Medication 2 G: at 15:28

## 2024-11-18 RX ADMIN — FENTANYL CITRATE 100 MCG: 50 INJECTION INTRAMUSCULAR; INTRAVENOUS at 15:38

## 2024-11-18 RX ADMIN — LIDOCAINE HYDROCHLORIDE 100 MG: 20 INJECTION, SOLUTION INFILTRATION; PERINEURAL at 15:36

## 2024-11-18 RX ADMIN — MIDAZOLAM 2 MG: 1 INJECTION INTRAMUSCULAR; INTRAVENOUS at 15:34

## 2024-11-18 RX ADMIN — SODIUM CHLORIDE, POTASSIUM CHLORIDE, SODIUM LACTATE AND CALCIUM CHLORIDE: 600; 310; 30; 20 INJECTION, SOLUTION INTRAVENOUS at 15:10

## 2024-11-18 RX ADMIN — PROPOFOL 50 MG: 10 INJECTION, EMULSION INTRAVENOUS at 15:37

## 2024-11-18 RX ADMIN — DEXAMETHASONE SODIUM PHOSPHATE 4 MG: 4 INJECTION, SOLUTION INTRA-ARTICULAR; INTRALESIONAL; INTRAMUSCULAR; INTRAVENOUS; SOFT TISSUE at 15:36

## 2024-11-18 RX ADMIN — ONDANSETRON 4 MG: 2 INJECTION INTRAMUSCULAR; INTRAVENOUS at 15:44

## 2024-11-18 RX ADMIN — PROPOFOL 150 MCG/KG/MIN: 10 INJECTION, EMULSION INTRAVENOUS at 15:37

## 2024-11-18 ASSESSMENT — ACTIVITIES OF DAILY LIVING (ADL)
ADLS_ACUITY_SCORE: 0

## 2024-11-18 ASSESSMENT — LIFESTYLE VARIABLES: TOBACCO_USE: 1

## 2024-11-18 NOTE — ANESTHESIA POSTPROCEDURE EVALUATION
Patient: Driss CHI Stone    Procedure: Procedure(s):  VASECTOMY       Anesthesia Type:  General    Note:  Disposition: Outpatient   Postop Pain Control: Uneventful            Sign Out: Well controlled pain   PONV: No   Neuro/Psych: Uneventful            Sign Out: Acceptable/Baseline neuro status   Airway/Respiratory: Uneventful            Sign Out: Acceptable/Baseline resp. status   CV/Hemodynamics: Uneventful            Sign Out: Acceptable CV status; No obvious hypovolemia; No obvious fluid overload   Other NRE: NONE   DID A NON-ROUTINE EVENT OCCUR? No       Last vitals:  Vitals Value Taken Time   /83 11/18/24 1645   Temp 36.2  C (97.1  F) 11/18/24 1627   Pulse 60 11/18/24 1645   Resp 14 11/18/24 1644   SpO2 98 % 11/18/24 1649   Vitals shown include unfiled device data.    Electronically Signed By: LAINEY Coley CRNA  November 18, 2024  5:56 PM

## 2024-11-18 NOTE — ANESTHESIA CARE TRANSFER NOTE
Patient: Driss CHI Stone    Procedure: Procedure(s):  VASECTOMY       Diagnosis: Encounter for vasectomy [Z30.2]  Diagnosis Additional Information: No value filed.    Anesthesia Type:   General     Note:    Oropharynx: oropharynx clear of all foreign objects  Level of Consciousness: drowsy          Vital Signs Stable: post-procedure vital signs reviewed and stable  Report to RN Given: handoff report given  Patient transferred to: Phase II    Handoff Report: Identifed the Patient, Identified the Reponsible Provider, Reviewed the pertinent medical history, Discussed the surgical course, Reviewed Intra-OP anesthesia mangement and issues during anesthesia, Set expectations for post-procedure period and Allowed opportunity for questions and acknowledgement of understanding  Vitals:  Vitals Value Taken Time   /78 11/18/24 1627   Temp 36.2  C (97.1  F) 11/18/24 1627   Pulse     Resp 16 11/18/24 1627   SpO2 97 % 11/18/24 1629   Vitals shown include unfiled device data.    Electronically Signed By: LAINEY Coley CRNA  November 18, 2024  4:30 PM

## 2024-11-18 NOTE — DISCHARGE INSTRUCTIONS
VASECTOMY PATIENT INFORMATION    What you need to know?    Vasectomy is intended to be as a permanent form of contraception.  Vasectomy does not produce immediate sterility until confirmed with testing.   Following a vasectomy, another form of contraception is required until sterility is confirmed by post- vasectomy semen analysis.  Even after vas occlusion is confirmed, vasectomy is not 100% reliable in preventing pregnancy.  The risk of pregnancy after vasectomy is approximately 1 in 2,000 for men who have no sperm found on post vasectomy testing.   Repeat vasectomy is necessary in <=1% of vasectomies.  Patients should refrain from ejaculation for approximately one week after vasectomy.  Options for fertility after vasectomy include vasectomy reversal and sperm retrieval with in vitro fertilization.   The rates of surgical complications such as symptomatic hematoma and infection are 1-2%.   Chronic scrotal pain associated with negative impact on quality of life occurs after vasectomy in about 1-2% of men. Some require additional surgery.  Other permanent and non-permanent alternatives to vasectomy are available.    Before the vasectomy:    Do not take any aspirin or aspirin like product the week before.  Shave the scrotal area before you arrive to clinic.   Shower the morning of your procedure.  Wear briefs, athletic supporter (no boxers).  Eat breakfast/lunch.     After the vasectomy:    Rest and place an ice pack over the scrotum, this will provide comfort and can prevent swelling if used in half hour intervals (1/2 hour on, ? hour off). Pain medication may be used as needed.   You may wear an athletic supporter, briefs, or any other clothing that keeps you comfortable.   Wait at least one week before resuming sexually activity. Ejaculating too soon after a vasectomy may increase the chances of bleeding, swelling, and possible vasectomy failure, you might be intimate with your partner but avoid ejaculation for a  week.  Try to limit any heavy lifting.  You may resume light activities as soon as you feel comfortable.  You may shower the day after the procedure. Avoid baths, hot tubs or swimming for 1-2 days.  Draining from the incision, swelling, some mild tenderness and bruising of the scrotum is not unusual. The edges of the incision may pull apart and heal rather slowly.   Sometimes a knot may be present at the site of the vasectomy which remains for several months.  If sutures are placed, they do not have to be removed. They will dissolve usually within 2-3 weeks.  Continue to use some other method of birth control until you perform a semen analysis and been cleared by your doctor.  Postoperative visit is optional, if you are having any problems such as fever, an unusual amount of pain, bruising, redness, or swelling or if you have any questions, call for an appointment    Instructions for semen collection after vasectomy:     Obtain a clean specimen container (specimen jar) from the Urology Clinic.  The specimen should be collected directly into the bottle on the morning of the test.  Specimen collection into a condom might create a false test result.  The specimen should be delivered to the lab within 2 hours from the time of collection.  The specimen can be taken to the [facility] lab between 8:00-10:00 AM (Mon-Fri).  You may contact the Urology Department 48 hours after the specimen was delivered to the laboratory.  Most men are cleared after the first semen analysis check, but you might be required to obtain one or more analysis before you are cleared:    First specimen - after 3 months time to allow the sperm to leave the seminal tract   Second sample is needed only if the first sample showed sperm present.     Frequently Asked Questions (adopted from the [employer]):    Can my partner tell if I have had a vasectomy?  Sperm adds very little to the semen volume, so you shouldn't notice any  change in your ejaculate after vasectomy. Your partner may sometimes be able to feel the vasectomy site. This is particularly true if you have developed a granuloma.  Will my sense of orgasm be changed by having a vasectomy?  Ejaculation and orgasm are usually not affected by vasectomy. The special case is the rare man who has developed post-vasectomy pain syndrome.  Can I become impotent after a vasectomy?  An uncomplicated vasectomy can't cause impotence.  Can a vasectomy fail?  There is a small chance that a vasectomy may fail. This occurs when sperm leaking from one end of the cut vas deferens find a channel to the other cut end.  Can something happen to my testicles?  In rare cases, the testicular artery may be hurt during vasectomy. Other problems, such as a mass of blood (hematoma) or infection, may also affect the testicles.  Can I have children after my vasectomy?  Yes, but if you haven't stored frozen sperm you'll need an additional procedure. The vas deferens can be microsurgically reconnected in a procedure called vasectomy reversal. If you don't want to have vasectomy reversal, sperm can be taken from the testicle or the epididymis and used for in vitro fertilization. These procedures are costly and may not be covered by your health plan. Also, they don't always work. If you think you may want to have children one day, you should look into nonsurgical forms of birth control before deciding to have a vasectomy.     Pain medications   - Please take over the counter tylenol ibuprofen for pain    Follow up  - Please follow up in 6-8 weeks to discuss your Peyronie's disease and ED.  For the vasectomy we will call you with your post vasectomy semen analysis results in 3 months when they have been completed. Please complete this and remember you are still fertile until we give you the clearance to stop using protection.   Please call the diagnostic andrology lab ([facility]) to schedule your [phone] for  the mainline or alternatively [phone]                      Same Day Surgery Discharge Instructions  Special Precautions After Surgery - Adult    It is not unusual to feel lightheaded or faint, up to 24 hours after surgery or while taking pain medication.  If you have these symptoms; sit for a few minutes before standing and have someone assist you when getting up.  You should rest and relax for the next 24 hours and must have someone stay with you for at least 24 hours after your discharge.  DO NOT DRIVE any vehicle or operate mechanical equipment for 24 hours following the end of your surgery.  DO NOT DRIVE while taking narcotic pain medications that have been prescribed by your physician.  If you had a limb operated on, you must be able to use it fully to drive.  DO NOT drink alcoholic beverages for 24 hours following surgery or while taking prescription pain medication.  Drink clear liquids (apple juice, ginger ale, broth, 7-Up, etc.).  Progress to your regular diet as you feel able.  Any questions call your physician and do not make important decisions for 24 hours.    Nausea and Vomiting: Nausea and vomiting can occur any time after receiving anesthesia. If you experience nausea and vomiting we encourage you to move to a clear liquid diet and advance your diet as tolerated. If nausea and vomiting do not improve within 12 hours please call the surgeon or present to the Emergency department.     Break-through Bleeding: If your experience bleeding from your surgical site apply pressure and additional dressing per nurse instruction. For simple problems such as a saturated dressing, you may need to reinforce the dressing with more gauze and tape and put slight pressure on the site. If bleeding does not subside contact the surgeon or present to the Emergency Department.    Post-op Infection: If you develop a fever of 100.4 or greater, have pus like drainage, redness, swelling or severe pain at the surgical site  not alleviated with pain medications; please contact the surgeon or present to the Emergency Department.     Medications:  {:[id number]}  __________________________________________________________________________________________________________________________________  IMPORTANT NUMBERS:    [facility] [place] Main Number:  [phone], [phone]  Pharmacy:  [phone]  Same Day Surgery:  [phone], for general post-op questions call Monday - Thursday until 8:30 p.m., Fridays until 6:00 p.m.   Mental Health Mobile Crisis line: [phone]                                                                      [facility], podiatry:  [phone]  [facility]:  [phone]     OB Clinic:  [phone]   General Surgery:  [phone]  Urology: [phone]  Specialty Access Center: [phone]

## 2024-11-18 NOTE — OP NOTE
Pre-procedure Diagnoses   Family Planing     Post-procedure Diagnoses   Sterilization, family planing      Surgeon: Dr. Salomon  Assistant: RAMOS Elam    Procedures   Bilateral VASECTOMY     Anesthesia: General     EBL: 2 ml    Specimen: Bilateral Vasa     Drains: None     Indications: 54 M with ED, PD desiring sterilization here for bilateral vasectomy.     VASECTOMY PROCEDURE     After consenting and explaining the procedure to the patient in details, a surgical time out was performed that included a confirmation of the patient's identity using two identifiers, the correct procedure and the correct site/side for the procedure. With the patient supine on the procedure table, his genitalia was prepped and draped in the usual sterile fashion. The right vas deferens was isolated beneath the skin. The overlying skin as well as the deeper tissues were infiltrated with local anesthetic.  Subsequently, using a 15 blade the skin was incised and entered. Using the ring vasectomy tenaculum, the vas was secured. The vasal sheath was incised the adventitia of the vas stripped proximally and distally for 1-2 centimeter. A half cm segment of the vas was excised. The lumen of the vas was fulgurated proximally and distally and a medium clip was placed just proximal to each free end. The lateral end of the vas was sutured to the vasal sheath using a Monocryl stitch to seclude it from the opposing vas. Hemostasis was achieved, the cut ends were returned to the right hemiscrotum and skin stitched with 4-0 Monocryl. The remainder of procedure was repeated on the left side as it had been done on the right. Hemostasis being adequate and no evidence of bleeding was noted.  A light gauze dressing was applied after the application of Bacitracin ointment to the skin incision. Patient tolerated procedure without any complications or concerns. Bilateral vasal specimen sent to pathology.     Patient will have post-vasectomy semen  analysis in 12 weeks and return to clinic. Post-procedural instructions given to patient as listed in patient instructions. A no narcotic pathway was utilized.    Christian Salomon MD, MS  Department of Urology  Infertility, Sexual Medicine, Reconstruction  [facility]

## 2024-11-20 LAB
PATH REPORT.COMMENTS IMP SPEC: NORMAL
PATH REPORT.COMMENTS IMP SPEC: NORMAL
PATH REPORT.FINAL DX SPEC: NORMAL
PATH REPORT.GROSS SPEC: NORMAL
PATH REPORT.MICROSCOPIC SPEC OTHER STN: NORMAL
PATH REPORT.RELEVANT HX SPEC: NORMAL
PHOTO IMAGE: NORMAL

## 2024-11-21 ENCOUNTER — TELEPHONE (OUTPATIENT)
Dept: SURGERY | Facility: CLINIC | Age: 54
End: 2024-11-21
Payer: COMMERCIAL

## 2024-11-22 NOTE — TELEPHONE ENCOUNTER
Called to discuss path results. Bilateral vasa as expected. Left message and mychart. Counseled to still get PVSA

## 2025-01-20 ENCOUNTER — OFFICE VISIT (OUTPATIENT)
Dept: UROLOGY | Facility: CLINIC | Age: 55
End: 2025-01-20

## 2025-01-20 VITALS
DIASTOLIC BLOOD PRESSURE: 88 MMHG | TEMPERATURE: 97.1 F | WEIGHT: 201 LBS | OXYGEN SATURATION: 100 % | SYSTOLIC BLOOD PRESSURE: 167 MMHG | BODY MASS INDEX: 28.77 KG/M2 | HEART RATE: 72 BPM | HEIGHT: 70 IN

## 2025-01-20 DIAGNOSIS — N40.0 BENIGN PROSTATIC HYPERPLASIA, UNSPECIFIED WHETHER LOWER URINARY TRACT SYMPTOMS PRESENT: ICD-10-CM

## 2025-01-20 DIAGNOSIS — N52.01 ERECTILE DYSFUNCTION DUE TO ARTERIAL INSUFFICIENCY: ICD-10-CM

## 2025-01-20 DIAGNOSIS — N48.6 PEYRONIE'S DISEASE: ICD-10-CM

## 2025-01-20 PROCEDURE — 99214 OFFICE O/P EST MOD 30 MIN: CPT | Performed by: STUDENT IN AN ORGANIZED HEALTH CARE EDUCATION/TRAINING PROGRAM

## 2025-01-20 RX ORDER — TADALAFIL 5 MG/1
5 TABLET ORAL DAILY
Qty: 60 TABLET | Refills: 4 | Status: SHIPPED | OUTPATIENT
Start: 2025-01-20

## 2025-01-20 ASSESSMENT — PAIN SCALES - GENERAL: PAINLEVEL_OUTOF10: NO PAIN (0)

## 2025-01-20 NOTE — PATIENT INSTRUCTIONS
Plication Grafting Penile implant    Surgical technique Stitches and sutures put on the longer side of the penis to pull the penis straight Incision is made on the short side of the penis and a graft -- either a vein or pig small intestine graft -- is placed to match the long side A prosthesis is placed to help erection and straighten the penis   Penile length Same as the length when penis stretched in the flaccid state. 20% decrease in length can be expected, risk of ED and pain is also expected. Stretched penile length About the same as stretched penile length   Risk of impotence after surgery Very small  From 10 percent to 50 percent depending on penile circulation N/A   Risk of nerve injury Small that can lead to sensation loss High that can lead to sensation loss Depends on the approach   Operation time 1 - 2 hours  3 to 4 hours 3 to 4 hours   Anesthesia General  General or epidural General or epidural   Hospital stay Not necessary Sometimes overnight Overnight stay   Return to work 1 day 2 to 3 days 2 to 3 weeks   Resume sexual intercourse 5 weeks 8 weeks 5 to 6 weeks   Other considerations Cannot correct indentation or hourglass deformity Second incision needed sometimes, also graft of tissue is used. Higher infection rate      Plication Grafting Penile implant    Surgical technique Stitches and sutures put on the longer side of the penis to pull the penis straight Incision is made on the short side of the penis and a graft -- either a vein or pig small intestine graft -- is placed to match the long side A prosthesis is placed to help erection and straighten the penis   Penile length Same as the length when penis stretched in the flaccid state. 20% decrease in length can be expected, risk of ED and pain is also expected. Stretched penile length About the same as stretched penile length   Risk of impotence after surgery Very small  From 10 percent to 50 percent depending on penile circulation N/A   Risk of  nerve injury Small that can lead to sensation loss High that can lead to sensation loss Depends on the approach   Operation time 1 - 2 hours  3 to 4 hours 3 to 4 hours   Anesthesia General  General or epidural General or epidural   Hospital stay Not necessary Sometimes overnight Overnight stay   Return to work 1 day 2 to 3 days 2 to 3 weeks   Resume sexual intercourse 5 weeks 8 weeks 5 to 6 weeks   Other considerations Cannot correct indentation or hourglass deformity Second incision needed sometimes, also graft of tissue is used. Higher infection rate     Peyronie's Plication/Grafting Instructions    Expectations:  -You may notice some blood on your dressing-- this is normal. This is not abnormal unless you see active bleeding from the incision -- please call in that circumstance.  - Moderate to significant bruising of the penis/scrotum is normal and may spread out over the first few days. As the bruise matures, it may change colors from purple to green to yellow -- this is also normal.  -The stitches will dissolve on their own, but this process may take a few weeks to complete -- do NOT try to pull out the stitches.    Medications:  -You can give tylenol and motrin/Toradol and Robaxin for pain/discomfort, which is typically mild. We recommend you give these around the clock for the first 24 hours and then only as needed. You can also stagger these medications to provide better pain control (see example below).  -Example: If you give tylenol at midnight, then give motrin at 3 AM, then tylenol would be due at 6 AM, then motrin would be due at 9 AM. etc.  -If staggering the medication is to troublesome, it is perfectly fine to give both medications at the same time every 6 hours.  - You do not need and will not be sent home with antibiotics     Restrictions:  - Please continue the dressing for 2-3 days. If it falls off before that it is ok, you do not need to reapply. If you have extreme swelling or difficulty  urinating then it is ok to take off the bandage temporarily   -No straddle devices if possible (e.g., bikes, horses, motorcycles) for 3 weeks  -No swimming for 6 weeks

## 2025-01-20 NOTE — PROGRESS NOTES
UROLOGY CLINIC NEW VISIT    Chief Complaint:  Desire for elective sterilization, ED, PD    Referring Provider:  Christian Salomon    Subjective:     Driss Saenz is a 53 year old male and is a new patient to the urology clinic seen today for family planning in consultation for Dr. Salomon, also with ED and stable phase PD.     No prior surgeries, no other significant medical history.   No anticoagulation or bleeding dyscrasias. 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 Had taken Benzo for Lasic eye surgery. 5/10 Erection  Has discussed with partner they are in agreement. Has 4 children     ED 1-2 years. Problems keeping erection, hard to get  Has cialis script has not tried yet. Took HIMs   T 374     PD/Curvature is present; is estimated 45 degrees. Causing some pain. Has had some slow progression, stable now. No pain at rest.      ASCVD Risk 6.7%, he has appropriate METS     Family history prostate cancer 78, no DM or Dupytrens     1/20/2024  Saw Calcium CT 0, no recs from cardiology safe for PDE    Tadalafil working well for erections. Around 9/10  Seemed to help also with stream and LUTS.     Still around 45 degrees curvature dorsally noted. 2 years noted worsening curvature. Been stable last 6-12 months stable. No pain at baseline.     Currently the patient has no fever, chills, nausea, vomiting or other signs/symptoms suggestive of acute systemic infection.    PMH  No past medical history on file.  PSH  Past Surgical History:   Procedure Laterality Date    VASECTOMY Bilateral 11/18/2024    Procedure: BILATERAL VASECTOMY;  Surgeon: Christian Salomon MD;  [facility]: [facility]  Family History   Problem Relation Age of Onset    Cancer Paternal Grandfather     Prostate Cancer Paternal Grandfather     Prostate Cancer Father      ALLERGY  Allergies   Allergen Reactions    Sulfa Antibiotics      MEDICATIONS  has a current medication list which includes the following prescription(s): tadalafil.  ROS:  Constitutional:  "negative  Eyes: negative  Ears/Nose/Throat/Mouth: negative  Respiratory: negative  Cardiovascular: negative  Gastrointestinal: negative  Genito-Urinary: as documented above in HPI  Musculoskeletal: negative  Neurological: negative  Integumentary: negative      Objective:     There were no vitals taken for this visit.   Physical Exam:  GENERAL: Patient is AOx3, in no acute distress  CARDIAC: regular rate  LUNG: breathing non labored  ABD: soft, nondistended  : Distal PD plaque around 1 cm dorsal shaft.   Vas incisions CDI no pain or TTP       LABORATORY:  No results found for: \"CREATININE\"  No results found for: \"PSA\"     Assessment:     Driss Saenz is a 53 year old male and is a new patient to the urology clinic seen today for family planning in consultation, desiring elective vasectomy, ED, PD.      Plan:   Post vasectomy   - Pending PVSA, counseled on completion of testing and use of protection    ED, stable at goal    - Continue Cialis daily. Cleared by cards, ok to up titrate PRN for intercourse      PD, not at goal   - He is interested in fixing PD   - He will obtain pictures of curvature   - Given good erections on meds the best options surveillance, Xiaflex, Plication. He is averse to needles, had syncopal episodes with exam so would prefer Plication   - Would like to wait until Feb post PVSA and when insurance resets to book though very interested instructions provided     BPH with LUTS, stable improved    - Cialis daily,     Men's health, family history prostate cancer   - Annual PSA screens     Will reach out to book procedure in early Feb     30 minutes spent on the date of the encounter doing (chart review/review of outside records/review of test results/interpretation of tests/reviewing imaging/patient visit/documentation/discussion with other provider(s)/discussion with family.    Christian Salomon MD     "

## 2025-02-05 ENCOUNTER — HOSPITAL ENCOUNTER (EMERGENCY)
Facility: CLINIC | Age: 55
Discharge: HOME | End: 2025-02-05

## 2025-02-05 VITALS
DIASTOLIC BLOOD PRESSURE: 81 MMHG | HEART RATE: 96 BPM | TEMPERATURE: 97.9 F | SYSTOLIC BLOOD PRESSURE: 151 MMHG | RESPIRATION RATE: 16 BRPM | OXYGEN SATURATION: 97 %

## 2025-02-05 DIAGNOSIS — M25.512 LEFT SHOULDER PAIN: ICD-10-CM

## 2025-02-05 PROCEDURE — 99213 OFFICE O/P EST LOW 20 MIN: CPT

## 2025-02-05 PROCEDURE — G0463 HOSPITAL OUTPT CLINIC VISIT: HCPCS

## 2025-02-05 RX ORDER — PREDNISONE 20 MG/1
TABLET ORAL
Qty: 10 TABLET | Refills: 0 | Status: SHIPPED | OUTPATIENT
Start: 2025-02-05

## 2025-02-05 RX ORDER — CYCLOBENZAPRINE HCL 10 MG
10 TABLET ORAL 3 TIMES DAILY PRN
Qty: 21 TABLET | Refills: 0 | Status: SHIPPED | OUTPATIENT
Start: 2025-02-05 | End: 2025-02-12

## 2025-02-05 ASSESSMENT — COLUMBIA-SUICIDE SEVERITY RATING SCALE - C-SSRS
1. IN THE PAST MONTH, HAVE YOU WISHED YOU WERE DEAD OR WISHED YOU COULD GO TO SLEEP AND NOT WAKE UP?: NO
2. HAVE YOU ACTUALLY HAD ANY THOUGHTS OF KILLING YOURSELF IN THE PAST MONTH?: NO

## 2025-02-05 ASSESSMENT — ACTIVITIES OF DAILY LIVING (ADL): ADLS_ACUITY_SCORE: 41

## 2025-02-05 NOTE — Clinical Note
Driss Stone was seen and treated in our emergency department on 2/5/2025.    No lifting greater than 10 pounds for the next 5 days.     Sincerely,     [facility]

## 2025-02-05 NOTE — DISCHARGE INSTRUCTIONS
Start taking prednisone with food once daily for the next 5 days.  Do not take NSAIDs (ibuprofen) with this.  You may continue taking Tylenol up to 1000 mg every 6 hours for pain.      Flexeril is a muscle relaxer this may make you sleepy.  Do not take while driving or operating heavy machinery.    Follow-up with physical therapy and orthopedics if pain worsens or does not improve over the next few days.         None known

## 2025-02-05 NOTE — ED PROVIDER NOTES
History   No chief complaint on file.    GIBRAN Saenz is a 54 year old male who presents urgent care with chief complaint of left shoulder pain.  Patient reports he originally slipped on a ladder causing a strain on his left shoulder few weeks ago.  Patient was then at work when he hit his head on a truss causing him to fall backwards.  He did note left shoulder pain at that time.  Patient reports worsening left-sided shoulder and neck pain since that time.  Pain is making it difficult to sleep.  He reports radiation of pain into his left arm and fingertips.  Pain is described as a burning sensation.  Patient does have mild lateral left neck tenderness.  No bony tenderness.  Denies headache, vision changes, weakness.    Allergies:  Allergies   Allergen Reactions    Sulfa Antibiotics        Problem List:    Patient Active Problem List    Diagnosis Date Noted    Encounter for vasectomy 09/06/2024     Priority: Medium    Other male erectile dysfunction 08/29/2024     Priority: Medium    CARDIOVASCULAR SCREENING; LDL GOAL LESS THAN 160 10/31/2010     Priority: Medium        Past Medical History:    No past medical history on file.    Past Surgical History:    Past Surgical History:   Procedure Laterality Date    VASECTOMY Bilateral 11/18/2024    Procedure: BILATERAL VASECTOMY;  Surgeon: Christian Salomon MD;  Location: [facility]       Family History:    Family History   Problem Relation Age of Onset    Cancer Paternal Grandfather     Prostate Cancer Paternal Grandfather     Prostate Cancer Father        Social History:  Marital Status:  Single [1]  Social History     Tobacco Use    Smoking status: Never    Smokeless tobacco: Current    Tobacco comments:     Nicotine pouches    Vaping Use    Vaping status: Never Used   Substance Use Topics    Alcohol use: Yes     Comment: 3-6 beers a day or a case beer per week    Drug use: No        Medications:    cyclobenzaprine (FLEXERIL) 10 MG tablet  predniSONE (DELTASONE) 20  MG tablet  tadalafil (CIALIS) 5 MG tablet          Review of Systems   All other systems reviewed and are negative.      Physical Exam   BP: (!) 151/81  Pulse: 96  Temp: 97.9  F (36.6  C)  Resp: 16  SpO2: 97 %      Physical Exam  Vitals and nursing note reviewed.   Constitutional:       General: He is not in acute distress.     Appearance: Normal appearance. He is not ill-appearing or toxic-appearing.   HENT:      Head: Normocephalic.   Cardiovascular:      Rate and Rhythm: Normal rate and regular rhythm.      Pulses: Normal pulses.   Pulmonary:      Effort: Pulmonary effort is normal.   Musculoskeletal:      Comments: Discomfort with empty can test on left side.  Patient has full range of motion of left shoulder.  5 out of 5 strength of upper extremities bilaterally.  Tenderness to palpation of the lateral neck and left shoulder.   Neurological:      Mental Status: He is alert.      Sensory: No sensory deficit.      Motor: No weakness.   Psychiatric:         Mood and Affect: Mood normal.         Behavior: Behavior normal.         ED Course        Procedures      No results found for this or any previous visit (from the past 24 hours).    Medications - No data to display    Assessments & Plan (with Medical Decision Making)     I have reviewed the nursing notes.    I have reviewed the findings, diagnosis, plan and need for follow up with the patient.        Medical Decision Making  54 year old male who presents urgent care with chief complaint of left shoulder pain.  Patient reports he originally slipped on a ladder causing a strain on his left shoulder few weeks ago.  Patient was then at work when he hit his head on a truss causing him to fall backwards.  He did note left shoulder pain at that time.  Patient reports worsening left-sided shoulder and neck pain since that time.  Pain is making it difficult to sleep.  He reports radiation of pain into his left arm and fingertips.  Pain is described as a burning  sensation.  Patient does have mild lateral left neck tenderness.  No bony tenderness.  Denies headache, vision changes, weakness.      Exam above.  Significant for:Discomfort with empty can test on left side.  Patient has full range of motion of left shoulder.  5 out of 5 strength of upper extremities bilaterally.  Tenderness to palpation of the lateral neck and left shoulder.    I suspect rotator cuff injury with inflammation causing nerve impingement.  This may also be stemming from cervical neck.  Plan treatment with prednisone and Flexeril today.  We also talked about pain management.  Plan follow-up with orthopedics if no improvement over the next 5 to 7 days.      Prior to making a final disposition on this patient the results of patient's tests and other diagnostic studies were discussed with the patient. All questions were answered. Patient expressed understanding of the plan and was amenable to it.     Disclaimer: This note consists of symbols derived from keyboarding, dictation and/or voice recognition software. As a result, there may be errors in the script that have gone undetected. Please consider this when interpreting information found in this chart.        Discharge Medication List as of 2/5/2025  3:36 PM        START taking these medications    Details   cyclobenzaprine (FLEXERIL) 10 MG tablet Take 1 tablet (10 mg) by mouth 3 times daily as needed for muscle spasms., Disp-21 tablet, R-0, E-Prescribe      predniSONE (DELTASONE) 20 MG tablet Take two tablets (= 40mg) each day for 5 (five) days, Disp-10 tablet, R-0, E-Prescribe             Final diagnoses:   Left shoulder pain       2/5/2025   [facility] EMERGENCY DEPT       Neida Jacobs PA-C  02/05/25 [id number]

## 2025-04-01 ENCOUNTER — TELEPHONE (OUTPATIENT)
Dept: FAMILY MEDICINE | Facility: CLINIC | Age: 55
End: 2025-04-01

## 2025-04-01 ENCOUNTER — TELEPHONE (OUTPATIENT)
Dept: FAMILY MEDICINE | Facility: OTHER | Age: 55
End: 2025-04-01

## 2025-04-01 NOTE — TELEPHONE ENCOUNTER
Patient is being seen for shooting pain in shoulder and legs, Provider said this needs to be in person  -Sofiya Baeza

## 2025-04-01 NOTE — TELEPHONE ENCOUNTER
Pt schedule a phone visit with Tia Casas today - at 2pm - however - after Provider reviewed chart feels like this visit should be done in person - Pt had received a msg from his PCP about this visit should also be done in person - Called pt today and left a voice msg for call back [facility] to reschedule visit in person or follow up in Urgent care clinic.  Barbara Rivero CMA ([facility])   (aka: Betzy Rivero)

## 2025-04-01 NOTE — TELEPHONE ENCOUNTER
Patient Contact    Attempt # 1    Was call answered?  No.  Left message on voicemail with information to call clinic and ask to speak to a Triage Nurse. Upon callback, triage symptoms further and assist with scheduling in person visit per provider.      Aarti Potts RN on 4/1/2025 at 1:14 PM

## 2025-04-10 ENCOUNTER — LAB (OUTPATIENT)
Dept: LAB | Facility: CLINIC | Age: 55
End: 2025-04-10
Payer: COMMERCIAL

## 2025-04-10 ENCOUNTER — OFFICE VISIT (OUTPATIENT)
Dept: FAMILY MEDICINE | Facility: CLINIC | Age: 55
End: 2025-04-10
Payer: COMMERCIAL

## 2025-04-10 VITALS
BODY MASS INDEX: 31.64 KG/M2 | HEART RATE: 81 BPM | OXYGEN SATURATION: 97 % | DIASTOLIC BLOOD PRESSURE: 96 MMHG | WEIGHT: 221 LBS | SYSTOLIC BLOOD PRESSURE: 152 MMHG | RESPIRATION RATE: 16 BRPM | TEMPERATURE: 97.2 F | HEIGHT: 70 IN

## 2025-04-10 DIAGNOSIS — M54.16 LUMBAR RADICULOPATHY: ICD-10-CM

## 2025-04-10 DIAGNOSIS — S39.92XA INJURY OF BACK, INITIAL ENCOUNTER: Primary | ICD-10-CM

## 2025-04-10 DIAGNOSIS — M54.12 CERVICAL RADICULOPATHY: ICD-10-CM

## 2025-04-10 DIAGNOSIS — Z30.2 ENCOUNTER FOR VASECTOMY: ICD-10-CM

## 2025-04-10 DIAGNOSIS — R03.0 ELEVATED BLOOD PRESSURE READING WITHOUT DIAGNOSIS OF HYPERTENSION: ICD-10-CM

## 2025-04-10 RX ORDER — HYDROCODONE BITARTRATE AND ACETAMINOPHEN 5; 325 MG/1; MG/1
1 TABLET ORAL EVERY 6 HOURS PRN
Qty: 28 TABLET | Refills: 0 | Status: SHIPPED | OUTPATIENT
Start: 2025-04-10

## 2025-04-10 RX ORDER — METHYLPREDNISOLONE 4 MG/1
TABLET ORAL
Qty: 21 TABLET | Refills: 0 | Status: SHIPPED | OUTPATIENT
Start: 2025-04-10

## 2025-04-10 RX ORDER — CYCLOBENZAPRINE HCL 10 MG
10 TABLET ORAL 3 TIMES DAILY PRN
Qty: 120 TABLET | Refills: 5 | Status: SHIPPED | OUTPATIENT
Start: 2025-04-10

## 2025-04-10 ASSESSMENT — PATIENT HEALTH QUESTIONNAIRE - PHQ9
SUM OF ALL RESPONSES TO PHQ QUESTIONS 1-9: 1
10. IF YOU CHECKED OFF ANY PROBLEMS, HOW DIFFICULT HAVE THESE PROBLEMS MADE IT FOR YOU TO DO YOUR WORK, TAKE CARE OF THINGS AT HOME, OR GET ALONG WITH OTHER PEOPLE: NOT DIFFICULT AT ALL
SUM OF ALL RESPONSES TO PHQ QUESTIONS 1-9: 1

## 2025-04-10 NOTE — PROGRESS NOTES
"  Assessment & Plan     Injury of back, initial encounter    - methylPREDNISolone (MEDROL DOSEPAK) 4 MG tablet therapy pack; Follow Package Directions    Lumbar radiculopathy    - cyclobenzaprine (FLEXERIL) 10 MG tablet; Take 1 tablet (10 mg) by mouth 3 times daily as needed for muscle spasms.  - HYDROcodone-acetaminophen (NORCO) 5-325 MG tablet; Take 1 tablet by mouth every 6 hours as needed for severe pain.  - MR Lumbar Spine w/o Contrast; Future  - methylPREDNISolone (MEDROL DOSEPAK) 4 MG tablet therapy pack; Follow Package Directions    Cervical radiculopathy    - cyclobenzaprine (FLEXERIL) 10 MG tablet; Take 1 tablet (10 mg) by mouth 3 times daily as needed for muscle spasms.  - HYDROcodone-acetaminophen (NORCO) 5-325 MG tablet; Take 1 tablet by mouth every 6 hours as needed for severe pain.  - MR Cervical Spine w/o Contrast; Future  - methylPREDNISolone (MEDROL DOSEPAK) 4 MG tablet therapy pack; Follow Package Directions    Elevated blood pressure reading without diagnosis of hypertension  No hx of hypertension, likely secondary to pain.           BMI  Estimated body mass index is 32.17 kg/m  as calculated from the following:    Height as of this encounter: 1.765 m (5' 9.5\").    Weight as of this encounter: 100.2 kg (221 lb).         FURTHER TESTING:       - MRI - cervical and lumbar, has failed conservative therapy.    FUTURE APPOINTMENTS:       - Follow-up visit in case of new or worsening symptoms. Treatment recommendation dependent on MRI results.     See Patient Instructions    Stephie Naqvi is a 54 year old, presenting for the following health issues:  Back Pain        4/10/2025     2:39 PM   Additional Questions   Roomed by Svetlana LAGUERRE     History of Present Illness       Back Pain:  He presents for follow up of back pain. Patient's back pain is a new problem.    Original cause of back pain: not sure  First noticed back pain: more than 1 month ago  Patient feels back pain: constantlyLocation of back " "pain:  Left middle of back, right hip and left hip  Description of back pain: burning and gnawing  Back pain spreads: right thigh, left thigh, right knee, left knee, right shoulder and left shoulder    Since patient first noticed back pain, pain is: gradually worsening  Does back pain interfere with his job:  Yes  On a scale of 1-10 (10 being the worst), patient describes pain as:  9  What makes back pain worse: bending, certain positions, lying down, sitting and standing   Acupuncture: not tried  Acetaminophen: helpful  Activity or exercise: helpful  Chiropractor:  Not tried  Cold: not tried  Heat: not tried  Massage: not tried  Muscle relaxants: not helpful  NSAIDS: helpful  Opioids: not tried  Physical Therapy: not tried  Rest: not helpful  Steroid Injection: not helpful  Stretching: not helpful  Surgery: not tried  TENS unit: not helpful  Topical pain relievers: not helpful  Other healthcare providers patient is seeing for back pain: None   He is taking medications regularly.          Numbness in legs. Pain radiates below knees and into arms. Midline tenderness at cervical spine. Denies weakness. Denies loss of bowel or bladder control. Is a  and was walking in a dark area with his flashlight 6 weeks ago and his his head hard on a truss. He flew backwards, dropped his light and since then has had radicular symptoms into arms and legs and midline low back pain. Hx of laminectomy 20 years ago, has had no symptoms since then until now. Has been taking 5 ibuprofen every 4 hours every day. Pain awakens him from sleep. Having trouble working.         Review of Systems  Constitutional, HEENT, cardiovascular, pulmonary, gi and gu systems are negative, except as otherwise noted.      Objective    BP (!) 152/96   Pulse 81   Temp 97.2  F (36.2  C) (Tympanic)   Resp 16   Ht 1.765 m (5' 9.5\")   Wt 100.2 kg (221 lb)   SpO2 97%   BMI 32.17 kg/m    Body mass index is 32.17 kg/m .  Physical Exam   GENERAL: alert " and appears uncomfortable  EYES: Eyes grossly normal to inspection and conjunctivae and sclerae normal  NEURO: Normal strength and tone, mentation intact and speech normal  Comprehensive back pain exam:  Tenderness of midline cervical and lumbar spine, Pain limits the following motions: all, Lower extremity strength functional and equal on both sides, and Straight leg raise negative bilaterally            Signed Electronically by: LAINEY Herzog CNP

## 2025-04-22 ENCOUNTER — HOSPITAL ENCOUNTER (OUTPATIENT)
Dept: MRI IMAGING | Facility: CLINIC | Age: 55
Discharge: HOME | End: 2025-04-22
Attending: NURSE PRACTITIONER | Admitting: NURSE PRACTITIONER
Payer: COMMERCIAL

## 2025-04-22 ENCOUNTER — TELEPHONE (OUTPATIENT)
Dept: FAMILY MEDICINE | Facility: CLINIC | Age: 55
End: 2025-04-22
Payer: COMMERCIAL

## 2025-04-22 DIAGNOSIS — M54.12 CERVICAL RADICULOPATHY: ICD-10-CM

## 2025-04-22 DIAGNOSIS — M54.16 LUMBAR RADICULOPATHY: ICD-10-CM

## 2025-04-22 PROCEDURE — 72141 MRI NECK SPINE W/O DYE: CPT

## 2025-04-22 PROCEDURE — 72148 MRI LUMBAR SPINE W/O DYE: CPT

## 2025-04-22 NOTE — TELEPHONE ENCOUNTER
Left message to call back. Please relay message - no disc herniations. There is some disc height loss in both cervical and lumbar spine that is worse in a couple areas. If his pain is not resolved with the Steroid recommend a spine specialist consult. I will put the referral in if needed.

## 2025-04-23 DIAGNOSIS — M54.12 CERVICAL RADICULOPATHY: ICD-10-CM

## 2025-04-23 DIAGNOSIS — M54.16 LUMBAR RADICULOPATHY: ICD-10-CM

## 2025-04-23 NOTE — RESULT ENCOUNTER NOTE
Left message to call back. Please relay message - no disc herniations. There is some disc height loss in both cervical and lumbar spine that is worse in a couple areas. If his pain is not resolved with the Steroid recommend a spine specialist consult. I will put the referral in.          LAINEY Amado CNP

## 2025-04-23 NOTE — TELEPHONE ENCOUNTER
The patient was notified and agrees with the plan. The patient was given the number for referrals.  Patient requested refill for pain medication, please see refill encounter.    Thank you  Kami TELLES RN

## 2025-04-23 NOTE — RESULT ENCOUNTER NOTE
Health Maintenance Due   Topic Date Due   • Influenza Vaccine (1) 09/01/2023   • Diabetes A1C  09/14/2023       Patient is due for topics as listed above but is not proceeding with Immunization(s) Influenza at this time.    Left message to call back. Please relay message - no disc herniations. There is some disc height loss in both cervical and lumbar spine that is worse in a couple areas. If his pain is not resolved with the Steroid recommend a spine specialist consult. I will put the referral in.    LAINEY Amado [facility]

## 2025-04-23 NOTE — TELEPHONE ENCOUNTER
The patient was given the results of his MRI ( see TE)  the patient requests refill of his Wid[place]. The patient is out of the medication. Will send to provider to review.    Thank you    Kami TELLES RN

## 2025-04-24 RX ORDER — HYDROCODONE BITARTRATE AND ACETAMINOPHEN 5; 325 MG/1; MG/1
1 TABLET ORAL EVERY 6 HOURS PRN
Qty: 28 TABLET | Refills: 0 | Status: SHIPPED | OUTPATIENT
Start: 2025-04-24

## 2025-04-29 ENCOUNTER — OFFICE VISIT (OUTPATIENT)
Dept: NEUROSURGERY | Facility: CLINIC | Age: 55
End: 2025-04-29
Payer: COMMERCIAL

## 2025-04-29 VITALS
DIASTOLIC BLOOD PRESSURE: 81 MMHG | SYSTOLIC BLOOD PRESSURE: 146 MMHG | OXYGEN SATURATION: 99 % | BODY MASS INDEX: 32.17 KG/M2 | HEIGHT: 70 IN | HEART RATE: 85 BPM

## 2025-04-29 DIAGNOSIS — M54.16 LUMBAR RADICULOPATHY: ICD-10-CM

## 2025-04-29 DIAGNOSIS — S39.92XA INJURY OF BACK, INITIAL ENCOUNTER: ICD-10-CM

## 2025-04-29 DIAGNOSIS — M54.12 CERVICAL RADICULOPATHY: ICD-10-CM

## 2025-04-29 PROCEDURE — 1125F AMNT PAIN NOTED PAIN PRSNT: CPT | Performed by: NURSE PRACTITIONER

## 2025-04-29 PROCEDURE — 99203 OFFICE O/P NEW LOW 30 MIN: CPT | Performed by: NURSE PRACTITIONER

## 2025-04-29 PROCEDURE — 3077F SYST BP >= 140 MM HG: CPT | Performed by: NURSE PRACTITIONER

## 2025-04-29 PROCEDURE — 3079F DIAST BP 80-89 MM HG: CPT | Performed by: NURSE PRACTITIONER

## 2025-04-29 ASSESSMENT — PAIN SCALES - GENERAL: PAINLEVEL_OUTOF10: SEVERE PAIN (7)

## 2025-04-29 NOTE — NURSING NOTE
"Driss Saenz is a 54 year old male who presents for:  Chief Complaint   Patient presents with    Neurologic Problem     Injury of back/neck pain. MRI 4/22/25 lumbar and cervical. DOI early 2/2025. Patient notes he hit his head on a truss and knocked him back. He heard a crunch in his neck. Pain is across the shoulder blades and lower back. Pain will radiate into bilateral arms. He will get shooting pains down the arms if he raises his arms up. Pain will also radiate into the bilateral leg, into buttock to the knees. The bilateral upper posterior leg feels dead. He was given hydrocodone for pain but is gone. Advil and tyl        Vitals:    Vitals:    04/29/25 1032   BP: (!) 146/81   Pulse: 85   SpO2: 99%   Height: 5' 9.5\" (1.765 m)       BMI:  Estimated body mass index is 32.17 kg/m  as calculated from the following:    Height as of this encounter: 5' 9.5\" (1.765 m).    Weight as of 4/10/25: 221 lb (100.2 kg).    Pain Score:  Severe Pain (7)        JAMES Damon to follow up with Primary Care provider regarding elevated blood pressure.    "

## 2025-04-29 NOTE — PATIENT INSTRUCTIONS
-Physical therapy ordered. They will contact you to schedule.  -Cervical injection ordered. They will contact you to schedule.   -Please contact my office if your symptoms persist  or worsen and we would have you follow up in clinic with Dr. Chris Pavon to discuss further results and recommendations.   -Please contact our clinic with questions or concerns at [phone].

## 2025-04-29 NOTE — LETTER
4/29/2025      Driss Sanez  [address] [place] [id number]      Dear Colleague,    Thank you for referring your patient, Driss Saenz, to the [facility]. Please see a copy of my visit note below.    [facility] Neurosurgery  Neurosurgery Clinic Visit      CC: neck and shoulder pain    Primary care Provider: Kami Champagne    Reason For Visit:   I was asked by Aminata Crystal CNP to consult on the patient for injury of back, cervical radiculopathy.    HPI: Driss Saenz is a 54 year old male with a injury to the head/neck 2 months ago. He states he was walking in a dark area and hit his head on a truss. Since then has had progressively worsening severe neck pain with radicular arm pain to the bilateral upper arms. No paresthesias or overt weakness in the hands. No overt weakness. States the pain is burning. Pain is worse at night. Also has pain in the low back into the bilateral buttock down the back sides of the legs with changing of positions, however these symptoms are not as bad as his neck symptoms. Has tried MDP and pain medicines without much benefit. Has not had any formal therapies.     No past medical history on file.    Past Medical History reviewed with patient during visit.    Past Surgical History:   Procedure Laterality Date     VASECTOMY Bilateral 11/18/2024    Procedure: BILATERAL VASECTOMY;  Surgeon: Christian Salomon MD;  Location: [facility] OR     Past Surgical History reviewed with patient during visit.    Current Outpatient Medications   Medication Sig Dispense Refill     cyclobenzaprine (FLEXERIL) 10 MG tablet Take 1 tablet (10 mg) by mouth 3 times daily as needed for muscle spasms. 120 tablet 5     methylPREDNISolone (MEDROL DOSEPAK) 4 MG tablet therapy pack Follow Package Directions 21 tablet 0     tadalafil (CIALIS) 5 MG tablet Take 1 tablet (5 mg) by mouth daily. 60 tablet 4     No current facility-administered medications for this visit.        Allergies   Allergen Reactions     Sulfa Antibiotics        Social History     Socioeconomic History     Marital status: Single     Spouse name: None     Number of children: None     Years of education: None     Highest education level: None   Tobacco Use     Smoking status: Never     Smokeless tobacco: Current     Tobacco comments:     Nicotine pouches    Vaping Use     Vaping status: Never Used   Substance and Sexual Activity     Alcohol use: Yes     Comment: 3-6 beers a day or a case beer per week     Drug use: No     Sexual activity: Never   Other Topics Concern     Parent/sibling w/ CABG, MI or angioplasty before 65F 55M? No     Social Drivers of Health     Financial Resource Strain: High Risk (8/29/2024)    Financial Resource Strain      Within the past 12 months, have you or your family members you live with been unable to get utilities (heat, electricity) when it was really needed?: Yes   Food Insecurity: High Risk (8/29/2024)    Food Insecurity      Within the past 12 months, did you worry that your food would run out before you got money to buy more?: No      Within the past 12 months, did the food you bought just not last and you didn t have money to get more?: Yes   Transportation Needs: Low Risk  (8/29/2024)    Transportation Needs      Within the past 12 months, has lack of transportation kept you from medical appointments, getting your medicines, non-medical meetings or appointments, work, or from getting things that you need?: No   Interpersonal Safety: Low Risk  (11/18/2024)    Interpersonal Safety      Do you feel physically and emotionally safe where you currently live?: Yes      Within the past 12 months, have you been hit, slapped, kicked or otherwise physically hurt by someone?: No      Within the past 12 months, have you been humiliated or emotionally abused in other ways by your partner or ex-partner?: No   Housing Stability: Low Risk  (8/29/2024)    Housing Stability      Do you have  "housing? : Yes      Are you worried about losing your housing?: No       Family History   Problem Relation Age of Onset     Cancer Paternal Grandfather      Prostate Cancer Paternal Grandfather      Prostate Cancer Father          ROS: 10 point ROS neg other than the symptoms noted above in the HPI.    Vital Signs:   BP (!) 146/81   Pulse 85   Ht 5' 9.5\" (1.765 m)   SpO2 99%   BMI 32.17 kg/m        Examination:  Constitutional:  Alert, well nourished, NAD.  Memory: recent and remote memory   HEENT: Normocephalic, atraumatic.   Pulm:  Without shortness of breath   CV:  No pitting edema of BLE.      Neurological:  Awake  Alert  Oriented x 3  Speech clear    Motor exam:   Shoulder Abduction:   Right:  5/5   Left:  5/5  Biceps:                        Right:  5/5   Left:  5/5  Triceps:                       Right:  5/5   Left:  5/5  Wrist Extensors:          Right:  5/5   Left:  5/5  Wrist Flexors:              Right:  5/5   Left:  5/5  Intrinsics:                     Right:  5/5   Left:  5/5    Sensation normal to bilateral upper and lower extremities  Muscle tone to bilateral upper and lower extremities   Gait: Able to stand from a seated position. Normal non-antalgic, non-myelopathic gait.  Able to heel/toe walk without loss of balance    Cervical examination reveals good range of motion.  No tenderness to palpation of the cervical spine or paraspinous muscles bilaterally.    Imaging:   Narrative & Impression   EXAM: MR CERVICAL SPINE W/O CONTRAST  LOCATION: [facility]  DATE: 4/22/2025     INDICATION: radicular sx and midline spine pain after injury  COMPARISON: None.  TECHNIQUE: MRI Cervical Spine without IV contrast.     FINDINGS:   Vertebral body heights are maintained. Marrow signal unremarkable.     No significant STIR edema within the vertebral column. No cord signal abnormality.     The visualized posterior fossa is unremarkable.     Mild multilevel loss of disc height.     C2-3: " Negative.     C3-4: No cord compression. Mild left foraminal narrowing. Facet disease.     C4-5: No cord compression. Mild right foraminal narrowing.     C5-6: Broad-based disc osteophyte complex with central protrusion without cord compression. Moderate bilateral foraminal narrowing. Facet disease.     C6-7: Broad-based disc osteophyte complex with mild cord compression ventrally. Uncovertebral joint space hypertrophy bilaterally with severe right and moderate left foraminal narrowing.     C7-T1: No cord compression or foraminal narrowing.     Limited assessment of the soft tissues of the neck are unremarkable.                                                                       IMPRESSION:  1.  Multilevel degenerative changes as detailed above most notably at C6-7 where there is severe right and moderate left foraminal narrowing. Please see above discussion for level specific changes.        Assessment/Plan:   Cervical radiculopathy    Reviewed cervical MRI. Will begin PT and try ELDA at this time. If symptoms persist or worsen, would have him follow up with Dr. Pavon to discuss other options at that time. He verbalized understanding and agreement.     Patient Instructions   -Physical therapy ordered. They will contact you to schedule.  -Cervical injection ordered. They will contact you to schedule.   -Please contact my office if your symptoms persist  or worsen and we would have you follow up in clinic with Dr. Chris Pavon to discuss further results and recommendations.   -Please contact our clinic with questions or concerns at [phone].    Mabel Thomas CNP  [facility] Neurosurgery  [address] [place] [address]  Tel [phone]  Fax [phone]      Again, thank you for allowing me to participate in the care of your patient.        Sincerely,        Mabel Thomas NP    Electronically signed

## 2025-04-29 NOTE — PROGRESS NOTES
[facility] Neurosurgery  Neurosurgery Clinic Visit      CC: neck and shoulder pain    Primary care Provider: Kami Champagne    Reason For Visit:   I was asked by Aminata Crystal CNP to consult on the patient for injury of back, cervical radiculopathy.    HPI: Driss Saenz is a 54 year old male with a injury to the head/neck 2 months ago. He states he was walking in a dark area and hit his head on a truss. Since then has had progressively worsening severe neck pain with radicular arm pain to the bilateral upper arms. No paresthesias or overt weakness in the hands. No overt weakness. States the pain is burning. Pain is worse at night. Also has pain in the low back into the bilateral buttock down the back sides of the legs with changing of positions, however these symptoms are not as bad as his neck symptoms. Has tried MDP and pain medicines without much benefit. Has not had any formal therapies.     No past medical history on file.    Past Medical History reviewed with patient during visit.    Past Surgical History:   Procedure Laterality Date    VASECTOMY Bilateral 11/18/2024    Procedure: BILATERAL VASECTOMY;  Surgeon: Christian Salomon MD;  Location: [facility] OR     Past Surgical History reviewed with patient during visit.    Current Outpatient Medications   Medication Sig Dispense Refill    cyclobenzaprine (FLEXERIL) 10 MG tablet Take 1 tablet (10 mg) by mouth 3 times daily as needed for muscle spasms. 120 tablet 5    methylPREDNISolone (MEDROL DOSEPAK) 4 MG tablet therapy pack Follow Package Directions 21 tablet 0    tadalafil (CIALIS) 5 MG tablet Take 1 tablet (5 mg) by mouth daily. 60 tablet 4     No current facility-administered medications for this visit.       Allergies   Allergen Reactions    Sulfa Antibiotics        Social History     Socioeconomic History    Marital status: Single     Spouse name: None    Number of children: None    Years of education: None    Highest education level: None    Tobacco Use    Smoking status: Never    Smokeless tobacco: Current    Tobacco comments:     Nicotine pouches    Vaping Use    Vaping status: Never Used   Substance and Sexual Activity    Alcohol use: Yes     Comment: 3-6 beers a day or a case beer per week    Drug use: No    Sexual activity: Never   Other Topics Concern    Parent/sibling w/ CABG, MI or angioplasty before 65F 55M? No     Social Drivers of Health     Financial Resource Strain: High Risk (8/29/2024)    Financial Resource Strain     Within the past 12 months, have you or your family members you live with been unable to get utilities (heat, electricity) when it was really needed?: Yes   Food Insecurity: High Risk (8/29/2024)    Food Insecurity     Within the past 12 months, did you worry that your food would run out before you got money to buy more?: No     Within the past 12 months, did the food you bought just not last and you didn t have money to get more?: Yes   Transportation Needs: Low Risk  (8/29/2024)    Transportation Needs     Within the past 12 months, has lack of transportation kept you from medical appointments, getting your medicines, non-medical meetings or appointments, work, or from getting things that you need?: No   Interpersonal Safety: Low Risk  (11/18/2024)    Interpersonal Safety     Do you feel physically and emotionally safe where you currently live?: Yes     Within the past 12 months, have you been hit, slapped, kicked or otherwise physically hurt by someone?: No     Within the past 12 months, have you been humiliated or emotionally abused in other ways by your partner or ex-partner?: No   Housing Stability: Low Risk  (8/29/2024)    Housing Stability     Do you have housing? : Yes     Are you worried about losing your housing?: No       Family History   Problem Relation Age of Onset    Cancer Paternal Grandfather     Prostate Cancer Paternal Grandfather     Prostate Cancer Father          ROS: 10 point ROS neg other than the  "symptoms noted above in the HPI.    Vital Signs:   BP (!) 146/81   Pulse 85   Ht 5' 9.5\" (1.765 m)   SpO2 99%   BMI 32.17 kg/m        Examination:  Constitutional:  Alert, well nourished, NAD.  Memory: recent and remote memory   HEENT: Normocephalic, atraumatic.   Pulm:  Without shortness of breath   CV:  No pitting edema of BLE.      Neurological:  Awake  Alert  Oriented x 3  Speech clear    Motor exam:   Shoulder Abduction:   Right:  5/5   Left:  5/5  Biceps:                        Right:  5/5   Left:  5/5  Triceps:                       Right:  5/5   Left:  5/5  Wrist Extensors:          Right:  5/5   Left:  5/5  Wrist Flexors:              Right:  5/5   Left:  5/5  Intrinsics:                     Right:  5/5   Left:  5/5    Sensation normal to bilateral upper and lower extremities  Muscle tone to bilateral upper and lower extremities   Gait: Able to stand from a seated position. Normal non-antalgic, non-myelopathic gait.  Able to heel/toe walk without loss of balance    Cervical examination reveals good range of motion.  No tenderness to palpation of the cervical spine or paraspinous muscles bilaterally.    Imaging:   Narrative & Impression   EXAM: MR CERVICAL SPINE W/O CONTRAST  LOCATION: [facility]  DATE: 4/22/2025     INDICATION: radicular sx and midline spine pain after injury  COMPARISON: None.  TECHNIQUE: MRI Cervical Spine without IV contrast.     FINDINGS:   Vertebral body heights are maintained. Marrow signal unremarkable.     No significant STIR edema within the vertebral column. No cord signal abnormality.     The visualized posterior fossa is unremarkable.     Mild multilevel loss of disc height.     C2-3: Negative.     C3-4: No cord compression. Mild left foraminal narrowing. Facet disease.     C4-5: No cord compression. Mild right foraminal narrowing.     C5-6: Broad-based disc osteophyte complex with central protrusion without cord compression. Moderate bilateral " foraminal narrowing. Facet disease.     C6-7: Broad-based disc osteophyte complex with mild cord compression ventrally. Uncovertebral joint space hypertrophy bilaterally with severe right and moderate left foraminal narrowing.     C7-T1: No cord compression or foraminal narrowing.     Limited assessment of the soft tissues of the neck are unremarkable.                                                                       IMPRESSION:  1.  Multilevel degenerative changes as detailed above most notably at C6-7 where there is severe right and moderate left foraminal narrowing. Please see above discussion for level specific changes.        Assessment/Plan:   Cervical radiculopathy    Reviewed cervical MRI. Will begin PT and try ELDA at this time. If symptoms persist or worsen, would have him follow up with Dr. Pavon to discuss other options at that time. He verbalized understanding and agreement.     Patient Instructions   -Physical therapy ordered. They will contact you to schedule.  -Cervical injection ordered. They will contact you to schedule.   -Please contact my office if your symptoms persist  or worsen and we would have you follow up in clinic with Dr. Chris Pavon to discuss further results and recommendations.   -Please contact our clinic with questions or concerns at [phone].    Mabel Thomas, [facility] Neurosurgery  [address] [place] [id number]  Tel [phone]  Fax [phone]

## 2025-05-08 ENCOUNTER — TELEPHONE (OUTPATIENT)
Dept: NEUROSURGERY | Facility: CLINIC | Age: 55
End: 2025-05-08
Payer: COMMERCIAL

## 2025-05-08 ENCOUNTER — TELEPHONE (OUTPATIENT)
Dept: NEUROSURGERY | Facility: CLINIC | Age: 55
End: 2025-05-08

## 2025-05-08 NOTE — TELEPHONE ENCOUNTER
----- Message from Renee SHORE sent at 5/8/2025 12:56 PM CDT -----  Regarding: r/s after conservative measures  Hello,Per discussion with Lacey, please cancel patient's upcoming appt with Savanah. Patient recently had a visit with Mabel who ordered physical therapy and an injection. Patient should complete those conservative measures. Once those are done and if patient is still having pain, he can rebook with Savanah.Thank you!Renee

## 2025-05-08 NOTE — TELEPHONE ENCOUNTER
Left Voicemail (1st Attempt) for the patient to call back and schedule the following:    Location:  Neurosurgery  Provider: Dr. Pavon   Appointment type: New Lumbar Spine  Appointment mode: In Clinic  Return date: can be r/s after completing physical therapy and injection    Specialty phone number: [phone]    Is Imaging Needed: no  Imaging Phone Number to provide to patient: n/a    Additional Notes: Patient needs appt cancelled with Dr. Pavon on 5/15 per note below. Patient needs to complete physical therapy and an injection before seeing Dr. Pavon. Patient can call back and r/s after those consecutive measures are taken.

## 2025-05-11 ENCOUNTER — HEALTH MAINTENANCE LETTER (OUTPATIENT)
Age: 55
End: 2025-05-11

## 2025-05-12 ENCOUNTER — NURSE TRIAGE (OUTPATIENT)
Dept: NURSING | Facility: CLINIC | Age: 55
End: 2025-05-12
Payer: COMMERCIAL

## 2025-05-12 ENCOUNTER — HOSPITAL ENCOUNTER (EMERGENCY)
Facility: CLINIC | Age: 55
Discharge: HOME | End: 2025-05-12
Attending: EMERGENCY MEDICINE | Admitting: EMERGENCY MEDICINE

## 2025-05-12 VITALS
HEART RATE: 111 BPM | OXYGEN SATURATION: 96 % | RESPIRATION RATE: 20 BRPM | WEIGHT: 218 LBS | SYSTOLIC BLOOD PRESSURE: 156 MMHG | BODY MASS INDEX: 31.73 KG/M2 | TEMPERATURE: 97.4 F | DIASTOLIC BLOOD PRESSURE: 92 MMHG

## 2025-05-12 DIAGNOSIS — M51.362 DEGENERATION OF INTERVERTEBRAL DISC OF LUMBAR REGION WITH DISCOGENIC BACK PAIN AND LOWER EXTREMITY PAIN: ICD-10-CM

## 2025-05-12 DIAGNOSIS — M50.123 CERVICAL DISC DISORDER AT C6-C7 LEVEL WITH RADICULOPATHY: ICD-10-CM

## 2025-05-12 DIAGNOSIS — M54.41 MIDLINE LOW BACK PAIN WITH BILATERAL SCIATICA, UNSPECIFIED CHRONICITY: ICD-10-CM

## 2025-05-12 DIAGNOSIS — M54.2 CERVICALGIA: ICD-10-CM

## 2025-05-12 DIAGNOSIS — M54.42 MIDLINE LOW BACK PAIN WITH BILATERAL SCIATICA, UNSPECIFIED CHRONICITY: ICD-10-CM

## 2025-05-12 PROCEDURE — 99284 EMERGENCY DEPT VISIT MOD MDM: CPT | Performed by: EMERGENCY MEDICINE

## 2025-05-12 RX ORDER — METHOCARBAMOL 750 MG/1
TABLET, FILM COATED ORAL
Qty: 40 TABLET | Refills: 0 | Status: SHIPPED | OUTPATIENT
Start: 2025-05-12

## 2025-05-12 RX ORDER — GABAPENTIN 300 MG/1
CAPSULE ORAL
Qty: 90 CAPSULE | Refills: 0 | Status: SHIPPED | OUTPATIENT
Start: 2025-05-12

## 2025-05-12 RX ORDER — OXYCODONE HYDROCHLORIDE 5 MG/1
5 TABLET ORAL EVERY 6 HOURS PRN
Qty: 12 TABLET | Refills: 0 | Status: SHIPPED | OUTPATIENT
Start: 2025-05-12

## 2025-05-12 ASSESSMENT — COLUMBIA-SUICIDE SEVERITY RATING SCALE - C-SSRS
6. HAVE YOU EVER DONE ANYTHING, STARTED TO DO ANYTHING, OR PREPARED TO DO ANYTHING TO END YOUR LIFE?: NO
1. IN THE PAST MONTH, HAVE YOU WISHED YOU WERE DEAD OR WISHED YOU COULD GO TO SLEEP AND NOT WAKE UP?: NO
2. HAVE YOU ACTUALLY HAD ANY THOUGHTS OF KILLING YOURSELF IN THE PAST MONTH?: NO

## 2025-05-12 ASSESSMENT — ACTIVITIES OF DAILY LIVING (ADL)
ADLS_ACUITY_SCORE: 41
ADLS_ACUITY_SCORE: 41

## 2025-05-12 NOTE — TELEPHONE ENCOUNTER
2nd attempt to reach patient    Appointment with Dr. Pavon on 5-15-25 needs to be rescheduled after patient has completed Physical therapy and injection .    Patient advised to call the clinic to confirm knowledge that this appointment needs to be cancelled and rescheduled at a later time.    Patient advised to call [phone] for the cancelling of the appoiantment.

## 2025-05-12 NOTE — ED PROVIDER NOTES
"  History     Chief Complaint   Patient presents with    Work Comp    Back Pain     HPI  History per patient, review of [facility] EMR and Care Everywhere EMR, including extensive chart review of multiple prior encounter notes (Ortho telephone encounter earlier today and last neurosurgery clinic encounter note 4/29/25), multiple prior imaging studies.  Driss Saenz is a 54 year old male who with history of cervical degenerative disease and C6-C7 severe right moderate left foraminal narrowing, and lumbar degenerative disc disease and status post L4-5 and L5-S1 of left hemilaminotomy, who presents emergency department for evaluation of acute on chronic lower cervical posterior neck pain radiating to the arms and hands bilaterally, left greater than right.  No acute injury or trauma.  Other than acute exacerbation of pain, his symptoms are not unusual in character.  Chronic neck pain since work-related injury earlier this year, exacerbated in the past several days with burning sensation radiating to the upper arms, worse on the left side with radiation involving the left thumb, index and middle finger areas and hypothenar area of the left thumb.  No weakness.  He also has chronic low back pain and bilateral posterior upper leg burning paresthesias into the \"hamstrings\", chronic and stable.  No bowel or bladder incontinence. No fever or chills.  Pain is refractory to Tylenol, ibuprofen and Flexeril.  He has physical therapy starting next week.  He has been followed by spine/neurosurgery and has an appointment in neurosurgery clinic in 3 days.      Previous Records Reviewed:  I reviewed his neurosurgery clinic encounter note from 4/29/2025 and Ortho clinic nurse triage encounter note from earlier today.    MR CERVICAL SPINE W/O CONTRAST - [facility]  DATE: 4/22/2025     INDICATION: radicular sx and midline spine pain after injury  COMPARISON: None.    FINDINGS:   Vertebral body heights are " maintained. Marrow signal unremarkable.     No significant STIR edema within the vertebral column. No cord signal abnormality.     The visualized posterior fossa is unremarkable.     Mild multilevel loss of disc height.     C2-3: Negative.     C3-4: No cord compression. Mild left foraminal narrowing. Facet disease.     C4-5: No cord compression. Mild right foraminal narrowing.     C5-6: Broad-based disc osteophyte complex with central protrusion without cord compression. Moderate bilateral foraminal narrowing. Facet disease.     C6-7: Broad-based disc osteophyte complex with mild cord compression ventrally. Uncovertebral joint space hypertrophy bilaterally with severe right and moderate left foraminal narrowing.     C7-T1: No cord compression or foraminal narrowing.     Limited assessment of the soft tissues of the neck are unremarkable.                                                                       IMPRESSION:  1.  Multilevel degenerative changes as detailed above most notably at C6-7 where there is severe right and moderate left foraminal narrowing. Please see above discussion for level specific changes.         MR LUMBAR SPINE W/O CONTRAST -[facility]  DATE: 4/22/2025     INDICATION: hx of laminectomy, radicular sx after injury  COMPARISON: None.    FINDINGS:   Vertebral body heights are maintained. Multilevel loss of disc height most notably at L5-S1.     No anterolisthesis or retrolisthesis.     Conus terminates normally. Congenitally short pedicles contribute to multilevel canal stenotic findings.     Marrow signal unremarkable.     L1-2: No canal stenosis or foraminal narrowing. Facet disease.     L2-3: Broad-based disc bulge eccentric to the left. Mild canal stenosis. Facet disease. Mild left foraminal narrowing.     L3-4: Broad-based disc bulge. Mild canal stenosis. Facet disease. Mild-moderate bilateral foraminal narrowing.     L4-5: Postsurgical changes of left left  hemilaminotomy. Moderate canal stenosis. Facet disease. Mild right and moderate left foraminal narrowing.     L5-S1: Postsurgical changes of left hemilaminotomy. No canal stenosis. Facet disease. Mild right and moderate to severe left foraminal narrowing.     Limited assessment of the intra-abdominal structures are unremarkable..                                                                     IMPRESSION:  1.  Multilevel degenerative changes as detailed above. Postoperative changes at L4-5 and L5-S1 of left hemilaminotomy. Multilevel degenerative changes as detailed above. Moderate canal stenosis at L4-5. The canal stenotic findings in the lumbar spine are exacerbated by congenitally short pedicles.    Allergies:  Allergies   Allergen Reactions    Sulfa Antibiotics        Problem List:    Patient Active Problem List    Diagnosis Date Noted    Cervical disc disorder at C6-C7 level with radiculopathy 05/12/2025     Priority: Medium    Degeneration of intervertebral disc of lumbar region with discogenic back pain and lower extremity pain 05/12/2025     Priority: Medium    Encounter for vasectomy 09/06/2024     Priority: Medium    Other male erectile dysfunction 08/29/2024     Priority: Medium    CARDIOVASCULAR SCREENING; LDL GOAL LESS THAN 160 10/31/2010     Priority: Medium        Past Medical History:    History reviewed. No pertinent past medical history.    Past Surgical History:    Past Surgical History:   Procedure Laterality Date    VASECTOMY Bilateral 11/18/2024    Procedure: BILATERAL VASECTOMY;  Surgeon: Christian Salomon MD;  Location: [facility] OR       Family History:    Family History   Problem Relation Age of Onset    Cancer Paternal Grandfather     Prostate Cancer Paternal Grandfather     Prostate Cancer Father        Social History:  Marital Status:  Single [1]  Social History     Tobacco Use    Smoking status: Never    Smokeless tobacco: Current    Tobacco comments:     Nicotine pouches    Vaping Use     Vaping status: Never Used   Substance Use Topics    Alcohol use: Yes     Comment: 3-6 beers a day or a case beer per week    Drug use: No        Medications:    gabapentin (NEURONTIN) 300 MG capsule  methocarbamol (ROBAXIN) 750 MG tablet  oxyCODONE (ROXICODONE) 5 MG tablet  cyclobenzaprine (FLEXERIL) 10 MG tablet  methylPREDNISolone (MEDROL DOSEPAK) 4 MG tablet therapy pack  tadalafil (CIALIS) 5 MG tablet        Review of Systems  As mentioned in the HPI, in addition focused review of systems was negative.    Physical Exam   BP: (!) 156/92  Pulse: 111  Temp: 97.4  F (36.3  C)  Resp: 20  Weight: 98.9 kg (218 lb)  SpO2: 96 %      Physical Exam  Vitals and nursing note reviewed.   Constitutional:       General: He is not in acute distress.     Appearance: Normal appearance. He is not ill-appearing.   Neck:      Vascular: Normal carotid pulses.      Trachea: Phonation normal.     Cardiovascular:      Rate and Rhythm: Normal rate and regular rhythm.      Pulses: Normal pulses.   Musculoskeletal:         General: No swelling. Normal range of motion.      Cervical back: Normal range of motion and neck supple. Tenderness present. No edema, erythema, rigidity or crepitus. Pain with movement and muscular tenderness present. No spinous process tenderness. Normal range of motion.   Skin:     General: Skin is warm and dry.      Capillary Refill: Capillary refill takes less than 2 seconds.      Coloration: Skin is not pale.      Findings: No erythema or rash.   Neurological:      Mental Status: He is alert.      Cranial Nerves: Cranial nerves 2-12 are intact.      Sensory: Sensation is intact. No sensory deficit.      Motor: Motor function is intact. No weakness or abnormal muscle tone.      Coordination: Coordination is intact.      Deep Tendon Reflexes: Reflexes are normal and symmetric. Reflexes normal.      Comments: Burning dysesthesia of the volar left thumb, index and middle finger areas and hypothenar area of the left  thumb.  No weakness.    Psychiatric:         Mood and Affect: Mood normal.         Behavior: Behavior normal.         ED Course        Procedures              No results found for this or any previous visit (from the past 24 hours).    Medications - No data to display    Assessments & Plan (with Medical Decision Making)   Chronic neck pain since work-related injury earlier this year, exacerbated in the past several days with burning sensation radiating to the upper arms, worse on the left side with radiation involving the left thumb, index and middle finger areas and hypothenar area of the left thumb.  No weakness.  Recent, 4/22/2025 MRI confirmed C6-7 broad-based disc osteophyte complex with mild cord compression ventrally. Uncovertebral joint space hypertrophy bilaterally with severe right and moderate left foraminal narrowing.   He also has chronic stable lumbar back pain radiating into the posterior upper legs bilaterally.  No motor deficit or signs or symptoms of cauda equina syndrome and he has an appointment in neurosurgery clinic in 3 days.  No current indication for emergent imaging evaluation today.  He is comfortable today's evaluation and disposition plan.  I will prescribe Neurontin, a small number of oxycodone and have him try methocarbamol as a muscle relaxant.  I recommended he make an appointment for follow-up with his primary care clinic as well. He was provided instructions for supportive care and will return as needed for worsened condition or worsening symptoms, or new problems or concerns.      I have reviewed the nursing notes.    I have reviewed the findings, diagnosis, plan and need for follow up with the patient.    Medical Decision Making: Moderate complexity        Discharge Medication List as of 5/12/2025  3:02 PM        START taking these medications    Details   gabapentin (NEURONTIN) 300 MG capsule Begin with 300 mg p.o. once daily, then increase to 300 mg p.o. twice daily, and then  to 300 mg 3 times daily if needed for back pain., Disp-90 capsule, R-0, E-Prescribe      methocarbamol (ROBAXIN) 750 MG tablet 1 tab by mouth every 4 hrs. or 2 tabs every 8 hrs. as needed for muscle spasm., Disp-40 tablet, R-0, E-Prescribe      oxyCODONE (ROXICODONE) 5 MG tablet Take 1 tablet (5 mg) by mouth every 6 hours as needed., Disp-12 tablet, R-0, E-Prescribe             Final diagnoses:   Cervicalgia   Cervical disc disorder at C6-C7 level with radiculopathy   Midline low back pain with bilateral sciatica, unspecified chronicity   Degeneration of intervertebral disc of lumbar region with discogenic back pain and lower extremity pain       5/12/2025   [facility] EMERGENCY DEPT       Colby Barnes MD  05/15/25 2862

## 2025-05-12 NOTE — ED TRIAGE NOTES
Pt comes from urgent care. Beginning in January, pt had neck and left-sided arm pain. Unable lift arm above head. Reports numbness and tingling in pinky,ring and middle finger. Also reports burning on lateral aspect of both legs. Denies changes to bowel/bladder. Has an appointment with neurosurgeon on Thursday. Here for pain control.     Triage Assessment (Adult)       Row Name 05/12/25 1409          Triage Assessment    Airway WDL WDL        Respiratory WDL    Respiratory WDL WDL        Skin Circulation/Temperature WDL    Skin Circulation/Temperature WDL WDL        Cardiac WDL    Cardiac WDL WDL        Peripheral/Neurovascular WDL    Peripheral Neurovascular WDL X;neurovascular assessment upper        LUE Neurovascular Assessment    Sensation LUE numbness present;tingling present  pinky, ring finger, middle finger

## 2025-05-12 NOTE — TELEPHONE ENCOUNTER
Nurse Triage SBAR    Is this a 2nd Level Triage? YES, LICENSED PRACTITIONER REVIEW IS REQUIRED    Situation: Pt with worsening N/T to bilat hands,  N/T from butt to knees, with No BM since Saturday,    Pain meds not helping much.  Not able to stand currently to well    Background: 4/29/2025  [facility] Mabel Bella NP  Neurological Surgery Injury of back, initial encounter +2 more  Dx Neurologic Problem ; Referred by Aminata Crystal APRN CNP  Reason for Visit     Neurologic Problem        Injury of back/neck pain. MRI 4/22/25 lumbar and cervical. DOI early 2/2025.         Assessment:   Patient unable to raise his arm up over head  N/T to both hands, with L>R.  The last few days getting worse, N/T fingertips  N/T to butt to knees, which make him have to walk very carefully, once he is able to, but is having difficulties  Shooting, burning, sharp pain to Left leg and partial right leg  Pain 10/10 with taking Tylenol and Advil and Flexeril without much relief  Denies fall   He states that when he goes to sit down that last foot or so in the recliner, he sort of falls into it, no sitting himself down in it.  Denies difficulty swallowing  Denies N/V  He states that he does have dry mouth  He does state that his hands have N/T to them the past few days  and it is getting worse.  He can not hardly make a fist.  N/T to fingertips.  Denies chest pain or shortness of breathe, but does indicate that his bilateral pecs in the front of chest have been painful  Pt is a regular bowel movement man and has not had BM since Saturday and feels bloated.  Protocol Recommended Disposition:   Go to ED    Recommendation:   Go to ED,  pt will go to [facility], which is the closest.  He will have someone transport him there    Routed to provider        Reason for Disposition   Back pain with numbness (loss of sensation) in groin or rectal area    Additional Information   Negative:  "Headache (with neurologic deficit)   Negative: Unable to urinate (or only a few drops) and bladder feels very full   Negative: Loss of bladder or bowel control (urine or bowel incontinence; wetting self, leaking stool) of new-onset    Answer Assessment - Initial Assessment Questions  1. SYMPTOM: \"What is the main symptom you are concerned about?\" (e.g., weakness, numbness)      extreme pain 10/10.  Shooting , burning sharp pain from his butt to his knee,  with trouble standing up.  When he goes to sit down that last foot or so into the recliner, he is more falling than sitting.  N/T to bilateral hands  L>R  last few days getting worse  No BM since Saturday.  He is a regular 0800 am person for BM.  Feels bloated    2. ONSET: \"When did this start?\" (minutes, hours, days; while sleeping)  Worse over the weekend  3. LAST NORMAL: \"When was the last time you (the patient) were normal (no symptoms)?\"        4. PATTERN \"Does this come and go, or has it been constant since it started?\"  \"Is it present now?\"      Getting worse  5. CARDIAC SYMPTOMS: \"Have you had any of the following symptoms: chest pain, difficulty breathing, palpitations?\"      Denies chest pain or shortness of breathe, but does indicate that his bilateral pecs in the front of chest have been painful  6. NEUROLOGIC SYMPTOMS: \"Have you had any of the following symptoms: headache, dizziness, vision loss, double vision, changes in speech, unsteady on your feet?\"      He has not fallen,but he has to walk carefully,  he has to stretch out slightly after getting up.  7. OTHER SYMPTOMS: \"Do you have any other symptoms?\"      N/T to left leg and partial right leg, if he is lying down it may lessen it somewhat.  Taking Tylenol and Advil and Flexeril for pain with no real help with pain  Denies difficulty swallowing  Denies N/V  He states that he does have dry mouth  He does state that his hands have N/T to them the past few days  and it is getting worse.  He can not " "hardly make a fist.  N/T to fingertips.    8. PREGNANCY: \"Is there any chance you are pregnant?\" \"When was your last menstrual period?\"      N/a    Protocols used: Neurologic Deficit-A-[place]    "

## 2025-05-14 ENCOUNTER — TELEPHONE (OUTPATIENT)
Dept: NEUROSURGERY | Facility: CLINIC | Age: 55
End: 2025-05-14
Payer: COMMERCIAL

## 2025-05-14 NOTE — TELEPHONE ENCOUNTER
Attempted to reach patient to remind them about appointment scheduled with Chris Pavon MD on 5/15/25 in our [place] clinic.    A voicemail was left with a call back number if the patient has questions or would like to reschedule.

## 2025-05-27 ENCOUNTER — OFFICE VISIT (OUTPATIENT)
Dept: FAMILY MEDICINE | Facility: CLINIC | Age: 55
End: 2025-05-27
Payer: COMMERCIAL

## 2025-05-27 VITALS
RESPIRATION RATE: 18 BRPM | SYSTOLIC BLOOD PRESSURE: 152 MMHG | WEIGHT: 216 LBS | OXYGEN SATURATION: 97 % | HEIGHT: 69 IN | HEART RATE: 90 BPM | TEMPERATURE: 98 F | BODY MASS INDEX: 31.99 KG/M2 | DIASTOLIC BLOOD PRESSURE: 90 MMHG

## 2025-05-27 DIAGNOSIS — M25.50 PAIN IN JOINT, MULTIPLE SITES: Primary | ICD-10-CM

## 2025-05-27 DIAGNOSIS — R73.09 ELEVATED GLUCOSE LEVEL: ICD-10-CM

## 2025-05-27 DIAGNOSIS — M54.12 CERVICAL RADICULOPATHY: ICD-10-CM

## 2025-05-27 DIAGNOSIS — M54.16 LUMBAR RADICULOPATHY: ICD-10-CM

## 2025-05-27 LAB
ALBUMIN SERPL BCG-MCNC: 4.2 G/DL (ref 3.5–5.2)
ALP SERPL-CCNC: 103 U/L (ref 40–150)
ALT SERPL W P-5'-P-CCNC: 23 U/L (ref 0–70)
ANION GAP SERPL CALCULATED.3IONS-SCNC: 12 MMOL/L (ref 7–15)
AST SERPL W P-5'-P-CCNC: 23 U/L (ref 0–45)
BILIRUB SERPL-MCNC: 0.2 MG/DL
BUN SERPL-MCNC: 12.6 MG/DL (ref 6–20)
CALCIUM SERPL-MCNC: 9.7 MG/DL (ref 8.8–10.4)
CHLORIDE SERPL-SCNC: 102 MMOL/L (ref 98–107)
CREAT SERPL-MCNC: 0.78 MG/DL (ref 0.67–1.17)
CRP SERPL-MCNC: 45.6 MG/L
EGFRCR SERPLBLD CKD-EPI 2021: >90 ML/MIN/1.73M2
ERYTHROCYTE [DISTWIDTH] IN BLOOD BY AUTOMATED COUNT: 14.4 % (ref 10–15)
ERYTHROCYTE [SEDIMENTATION RATE] IN BLOOD BY WESTERGREN METHOD: 52 MM/HR (ref 0–20)
GLUCOSE SERPL-MCNC: 146 MG/DL (ref 70–99)
HCO3 SERPL-SCNC: 26 MMOL/L (ref 22–29)
HCT VFR BLD AUTO: 34.5 % (ref 40–53)
HGB BLD-MCNC: 11.4 G/DL (ref 13.3–17.7)
MCH RBC QN AUTO: 28.4 PG (ref 26.5–33)
MCHC RBC AUTO-ENTMCNC: 33 G/DL (ref 31.5–36.5)
MCV RBC AUTO: 86 FL (ref 78–100)
PLATELET # BLD AUTO: 497 10E3/UL (ref 150–450)
POTASSIUM SERPL-SCNC: 4 MMOL/L (ref 3.4–5.3)
PROT SERPL-MCNC: 7.5 G/DL (ref 6.4–8.3)
RBC # BLD AUTO: 4.02 10E6/UL (ref 4.4–5.9)
SODIUM SERPL-SCNC: 140 MMOL/L (ref 135–145)
URATE SERPL-MCNC: 5 MG/DL (ref 3.4–7)
WBC # BLD AUTO: 8.7 10E3/UL (ref 4–11)

## 2025-05-27 PROCEDURE — 86618 LYME DISEASE ANTIBODY: CPT | Performed by: NURSE PRACTITIONER

## 2025-05-27 PROCEDURE — 86431 RHEUMATOID FACTOR QUANT: CPT | Performed by: NURSE PRACTITIONER

## 2025-05-27 PROCEDURE — 84550 ASSAY OF BLOOD/URIC ACID: CPT | Performed by: NURSE PRACTITIONER

## 2025-05-27 PROCEDURE — 36415 COLL VENOUS BLD VENIPUNCTURE: CPT | Performed by: NURSE PRACTITIONER

## 2025-05-27 PROCEDURE — 80053 COMPREHEN METABOLIC PANEL: CPT | Performed by: NURSE PRACTITIONER

## 2025-05-27 PROCEDURE — 3079F DIAST BP 80-89 MM HG: CPT | Performed by: NURSE PRACTITIONER

## 2025-05-27 PROCEDURE — 1125F AMNT PAIN NOTED PAIN PRSNT: CPT | Performed by: NURSE PRACTITIONER

## 2025-05-27 PROCEDURE — 86140 C-REACTIVE PROTEIN: CPT | Performed by: NURSE PRACTITIONER

## 2025-05-27 PROCEDURE — 3077F SYST BP >= 140 MM HG: CPT | Performed by: NURSE PRACTITIONER

## 2025-05-27 PROCEDURE — 86038 ANTINUCLEAR ANTIBODIES: CPT | Performed by: NURSE PRACTITIONER

## 2025-05-27 PROCEDURE — 85652 RBC SED RATE AUTOMATED: CPT | Performed by: NURSE PRACTITIONER

## 2025-05-27 PROCEDURE — 85027 COMPLETE CBC AUTOMATED: CPT | Performed by: NURSE PRACTITIONER

## 2025-05-27 PROCEDURE — 99214 OFFICE O/P EST MOD 30 MIN: CPT | Performed by: NURSE PRACTITIONER

## 2025-05-27 PROCEDURE — 83036 HEMOGLOBIN GLYCOSYLATED A1C: CPT | Performed by: NURSE PRACTITIONER

## 2025-05-27 PROCEDURE — 84681 ASSAY OF C-PEPTIDE: CPT | Performed by: NURSE PRACTITIONER

## 2025-05-27 RX ORDER — PREGABALIN 50 MG/1
50 CAPSULE ORAL 2 TIMES DAILY
Qty: 60 CAPSULE | Refills: 1 | Status: SHIPPED | OUTPATIENT
Start: 2025-05-27

## 2025-05-27 ASSESSMENT — PAIN SCALES - GENERAL: PAINLEVEL_OUTOF10: MODERATE PAIN (6)

## 2025-05-27 ASSESSMENT — ANXIETY QUESTIONNAIRES
1. FEELING NERVOUS, ANXIOUS, OR ON EDGE: NOT AT ALL
GAD7 TOTAL SCORE: 0
5. BEING SO RESTLESS THAT IT IS HARD TO SIT STILL: NOT AT ALL
IF YOU CHECKED OFF ANY PROBLEMS ON THIS QUESTIONNAIRE, HOW DIFFICULT HAVE THESE PROBLEMS MADE IT FOR YOU TO DO YOUR WORK, TAKE CARE OF THINGS AT HOME, OR GET ALONG WITH OTHER PEOPLE: NOT DIFFICULT AT ALL
2. NOT BEING ABLE TO STOP OR CONTROL WORRYING: NOT AT ALL
3. WORRYING TOO MUCH ABOUT DIFFERENT THINGS: NOT AT ALL
GAD7 TOTAL SCORE: 0
7. FEELING AFRAID AS IF SOMETHING AWFUL MIGHT HAPPEN: NOT AT ALL
6. BECOMING EASILY ANNOYED OR IRRITABLE: NOT AT ALL

## 2025-05-27 ASSESSMENT — PATIENT HEALTH QUESTIONNAIRE - PHQ9
5. POOR APPETITE OR OVEREATING: NOT AT ALL
SUM OF ALL RESPONSES TO PHQ QUESTIONS 1-9: 0

## 2025-05-27 NOTE — PROGRESS NOTES
Assessment & Plan     Pain in joint, multiple sites  Ordered CBC, Lyme's, ESR/CRP, uric acid, QUE and RA/C-peptide for rule out autoimmune causes, inflammation causes and possible rheumatoid arthritis.  Will plan to refer to rheumatology if Lyme's is negative.  - CBC with platelets; Future  - LYME DISEASE TOTAL ANTIBODIES WITH REFLEX TO CONFIRMATION; Future  - ESR: Erythrocyte sedimentation rate; Future  - CRP, inflammation; Future  - Uric acid; Future  - Anti Nuclear Lainey IgG by IFA with Reflex; Future  - Rheumatoid factor; Future  - C-peptide; Future  - Comprehensive metabolic panel (BMP + Alb, Alk Phos, ALT, AST, Total. Bili, TP); Future  - CBC with platelets  - LYME DISEASE TOTAL ANTIBODIES WITH REFLEX TO CONFIRMATION  - ESR: Erythrocyte sedimentation rate  - CRP, inflammation  - Uric acid  - Anti Nuclear Lainey IgG by IFA with Reflex  - Rheumatoid factor  - C-peptide  - Comprehensive metabolic panel (BMP + Alb, Alk Phos, ALT, AST, Total. Bili, TP)    Lumbar radiculopathy  Discontinuing gabapentin and starting Lyrica 50 mg 1 time a day for a week and then increasing to twice daily to see if this improves symptoms without the brain fog that he was feeling on the other medication.  Follow-up recommended if any side effects to the medication that are occurring.  - pregabalin (LYRICA) 50 MG capsule; Take 1 capsule (50 mg) by mouth 2 times daily. Start once daily at bedtime for 1 week and then increase to twice daily.    Cervical radiculopathy  See note above.  - pregabalin (LYRICA) 50 MG capsule; Take 1 capsule (50 mg) by mouth 2 times daily. Start once daily at bedtime for 1 week and then increase to twice daily.    See AVS for patient instructions    Stephie Naqvi is a 54 year old, presenting for the following health issues:  Joint Pain        5/27/2025     1:38 PM   Additional Questions   Roomed by rmb   Accompanied by self         5/27/2025     1:38 PM   Patient Reported Additional Medications   Patient  reports taking the following new medications none     History of Present Illness       Reason for visit:  Lime disease test.   Medication refills He is missing 1 dose(s) of medications per week.  He is not taking prescribed medications regularly due to remembering to take and other.      Pain History:  When did you first notice your pain? 2 months ago   Have you seen this provider for your pain in the past? Yes   Where in your body do you have pain? Joints all over body  Are you seeing anyone else for your pain? No    Patient presents today stating that he is having multiple joint pain.  He has no known tick bites that he is aware of or any rashes on his skin but has been having ongoing multiple joint pain.  He is concerned about Lyme's and would like this test.  He describes the pain as all over including hands, feet, ankles, knees and hips.  He is unable to close his hands completely without them feeling tight.  Patient notes he had some amoxicillin at home and started taking it and started feeling better but then got worse again off the medication.  He does state that ibuprofen does help as well.  He also does have history of lumbar and cervical radiculopathy also.  He does not like the way he is feeling on his gabapentin and would like to try something different for medication for this.        8/29/2024     7:34 AM 4/10/2025     2:39 PM 5/27/2025     1:43 PM   PHQ-9 SCORE   PHQ-9 Total Score MyChart 1 (Minimal depression) 1 (Minimal depression)    PHQ-9 Total Score 1 1  0       Patient-reported           8/29/2024     7:56 AM 5/27/2025     1:43 PM   JOVANI-7 SCORE   Total Score 2 0           5/27/2025     1:43 PM   PEG Score   PEG Total Score 6.33           8/29/2024     7:34 AM 4/10/2025     2:39 PM 5/27/2025     1:43 PM   PHQ-9 SCORE   PHQ-9 Total Score MyChart 1 (Minimal depression) 1 (Minimal depression)    PHQ-9 Total Score 1 1  0       Patient-reported           8/29/2024     7:56 AM 5/27/2025     1:43 PM  "  JOVANI-7 SCORE   Total Score 2 0           5/27/2025     1:43 PM   PEG Score   PEG Total Score 6.33         Review of Systems  CONSTITUTIONAL: NEGATIVE for fever, chills, change in weight  RESP: NEGATIVE for significant cough or SOB  CV: NEGATIVE for chest pain, palpitations or peripheral edema  MUSCULOSKELETAL: POSITIVE  for arthralgias as per HPI and radicular pain cervical and lumbar with prior history  PSYCHIATRIC: NEGATIVE for changes in mood or affect  ROS otherwise negative      Objective    BP (!) 152/90   Pulse 90   Temp 98  F (36.7  C) (Tympanic)   Resp 18   Ht 1.753 m (5' 9\")   Wt 98 kg (216 lb)   SpO2 97%   BMI 31.90 kg/m    Body mass index is 31.9 kg/m .  Physical Exam   GENERAL: alert and no distress  RESP: lungs clear to auscultation - no rales, rhonchi or wheezes  CV: regular rate and rhythm, normal S1 S2, no S3 or S4, no murmur, click or rub, no peripheral edema  MS: Patient clearly has some stiffness in his hands on exam today, he is able to move all joints normally but this does cause pain and does have some reduced flexion in the knees.  PSYCH:  Negative for changes in mood        Signed Electronically by: Kami Champagne NP    "

## 2025-05-27 NOTE — PATIENT INSTRUCTIONS
Labs today.  Stopping Gabapentin and starting Lyrica 50 mg at bedtime for a week and then increasing to 50 mg twice daily.  Continue use of robaxin.  Call if pain is not improved with Lyrica and I will give you some oxycodone.  If labs are negative, plan to see rheumatology.

## 2025-05-28 LAB
B BURGDOR IGG+IGM SER QL: 0.22
C PEPTIDE SERPL-MCNC: 8.4 NG/ML (ref 0.9–6.9)
FASTING STATUS PATIENT QL REPORTED: NO
RHEUMATOID FACT SERPL-ACNC: <10 IU/ML

## 2025-05-29 ENCOUNTER — RESULTS FOLLOW-UP (OUTPATIENT)
Dept: FAMILY MEDICINE | Facility: CLINIC | Age: 55
End: 2025-05-29

## 2025-05-29 DIAGNOSIS — R94.7 ELEVATED C PEPTIDE LEVEL: ICD-10-CM

## 2025-05-29 DIAGNOSIS — M25.50 PAIN IN JOINT, MULTIPLE SITES: ICD-10-CM

## 2025-05-29 DIAGNOSIS — R70.0 ELEVATED ERYTHROCYTE SEDIMENTATION RATE: ICD-10-CM

## 2025-05-29 DIAGNOSIS — R73.09 ELEVATED GLUCOSE LEVEL: Primary | ICD-10-CM

## 2025-05-29 DIAGNOSIS — R79.82 ELEVATED C-REACTIVE PROTEIN (CRP): ICD-10-CM

## 2025-05-29 LAB
ANA SER QL IF: NEGATIVE
EST. AVERAGE GLUCOSE BLD GHB EST-MCNC: 111 MG/DL
HBA1C MFR BLD: 5.5 % (ref 0–5.6)

## 2025-05-29 RX ORDER — PREDNISONE 20 MG/1
TABLET ORAL
Qty: 20 TABLET | Refills: 0 | Status: SHIPPED | OUTPATIENT
Start: 2025-05-29 | End: 2025-05-29

## 2025-06-10 ENCOUNTER — MYC REFILL (OUTPATIENT)
Dept: FAMILY MEDICINE | Facility: CLINIC | Age: 55
End: 2025-06-10

## 2025-06-10 ENCOUNTER — THERAPY VISIT (OUTPATIENT)
Dept: PHYSICAL THERAPY | Facility: CLINIC | Age: 55
End: 2025-06-10
Attending: NURSE PRACTITIONER
Payer: COMMERCIAL

## 2025-06-10 DIAGNOSIS — M54.12 CERVICAL RADICULOPATHY: ICD-10-CM

## 2025-06-10 DIAGNOSIS — M54.12 CERVICAL RADICULOPATHY: Primary | ICD-10-CM

## 2025-06-10 DIAGNOSIS — M54.16 LUMBAR RADICULOPATHY: ICD-10-CM

## 2025-06-10 PROCEDURE — 97110 THERAPEUTIC EXERCISES: CPT | Mod: GP | Performed by: PHYSICAL THERAPIST

## 2025-06-10 PROCEDURE — 97140 MANUAL THERAPY 1/> REGIONS: CPT | Mod: GP | Performed by: PHYSICAL THERAPIST

## 2025-06-10 PROCEDURE — 97162 PT EVAL MOD COMPLEX 30 MIN: CPT | Mod: GP | Performed by: PHYSICAL THERAPIST

## 2025-06-10 RX ORDER — METHOCARBAMOL 750 MG/1
TABLET, FILM COATED ORAL
Qty: 40 TABLET | Refills: 0 | Status: SHIPPED | OUTPATIENT
Start: 2025-06-10

## 2025-06-10 RX ORDER — PREGABALIN 50 MG/1
50 CAPSULE ORAL 2 TIMES DAILY
Qty: 60 CAPSULE | Refills: 1 | Status: CANCELLED | OUTPATIENT
Start: 2025-06-10

## 2025-06-10 NOTE — PROGRESS NOTES
PHYSICAL THERAPY EVALUATION  Type of Visit: Evaluation       Fall Risk Screen:  Have you fallen 2 or more times in the past year?: No  Have you fallen and had an injury in the past year?: Yes      Subjective   Neck pain worse than back. Lying in bed hands go numb. Has to sleep in recliner. Pain goes across mid back down arms to hands, wakes him form sleep. Hit head on a truss in Feb 2025. Digits 1-3 numb all the time. This has been going on a while. LBP, back of thigh hurt with sitting, driving.       Presenting condition or subjective complaint: Numbness in both hand and pain and stiffness  Date of onset: 02/15/25 (approx)    Relevant medical history: Neck injury; Pain at night or rest; Rheumatoid arthritis; Significant weakness   Dates & types of surgery:  Postoperative changes at L4-5 and L5-S1 of left hemilaminotomy 20 yrs ago    Prior diagnostic imaging/testing results: MRI     Prior therapy history for the same diagnosis, illness or injury: No      Prior Level of Function  Transfers: Independent  Ambulation: Independent  ADL: Independent  IADL:     Living Environment  Social support: With family members   Type of home: House; Multi-level   Stairs to enter the home: Yes 7 Is there a railing: Yes     Ramp: No   Stairs inside the home: No       Help at home: None  Equipment owned:       Employment: Yes    Hobbies/Interests:      Patient goals for therapy: Work / play without numbness / pain    Pain assessment:      Objective   CERVICAL SPINE EVALUATION  PAIN:   INTEGUMENTARY (edema, incisions):   POSTURE: slouched sitting, forward head, protracted scapula  GAIT:   Weightbearing Status:   Assistive Device(s):   Gait Deviations:   BALANCE/PROPRIOCEPTION:   WEIGHTBEARING ALIGNMENT:   ROM: CROM flex WNL pulls, EXT 25% lower C pain, ROT WNL, SB WNL  LROM flex WFL, EXT WFL, SB WFL  AROM Shld flex 80* B, ABD 45* B, PROM flex 130* L, 120* R, ER 70* R, 50* L at abd 45    MYOTOMES: need to assess    NEURAL TENSION:    FLEXIBILITY:    SPECIAL TESTS: Phalen's +B, however wrist flexion very limited and painful also, cervical distrx neg  PALPATION: FRSR T1, hypomobile CT, T1-3, tight B up trap, levator R, post cerv  SPINAL SEGMENTAL CONCLUSIONS:       Assessment & Plan   CLINICAL IMPRESSIONS  Medical Diagnosis: Cervical radiculopathy    Treatment Diagnosis: carpal tunnel B, neck pain, LBP   Impression/Assessment: Patient is a 54 year old male with neck, B hand, LB  complaints.  The following significant findings have been identified: Pain, Decreased ROM/flexibility, and Decreased joint mobility. These impairments interfere with their ability to perform self care tasks, work tasks, recreational activities, and household chores as compared to previous level of function.     Clinical Decision Making (Complexity):  Clinical Presentation: Evolving/Changing  Clinical Presentation Rationale: based on medical and personal factors listed in PT evaluation  Clinical Decision Making (Complexity): Moderate complexity    PLAN OF CARE  Treatment Interventions:  Interventions: Manual Therapy, Neuromuscular Re-education, Therapeutic Activity, Therapeutic Exercise, Self-Care/Home Management    Long Term Goals     PT Goal 1  Goal Identifier: 1  Goal Description: pt will not wake sleeping due to neck/arm pain in 4wk  Target Date: 07/08/25  PT Goal 2  Goal Identifier: 2  Goal Description: pt will be able to do all household tasks w/o UE sx in 6wk  Target Date: 07/22/25  PT Goal 3  Goal Identifier: 3  Goal Description: will be able to sit/drive for work as needed w/o LBP  Target Date: 07/22/25      Frequency of Treatment: 1x/wk  Duration of Treatment: 6wks    Recommended Referrals to Other Professionals:   Education Assessment:   Learner/Method: Patient;Pictures/Video;No Barriers to Learning  Education Comments: to get B wrist spints wear at night, try with work    Risks and benefits of evaluation/treatment have been explained.    Patient/Family/caregiver agrees with Plan of Care.     Evaluation Time:     PT Forrest, Moderate Complexity Minutes (45175): 20       Signing Clinician: Kris Hoenk, PT

## 2025-06-12 ENCOUNTER — THERAPY VISIT (OUTPATIENT)
Dept: PHYSICAL THERAPY | Facility: CLINIC | Age: 55
End: 2025-06-12
Attending: NURSE PRACTITIONER
Payer: COMMERCIAL

## 2025-06-12 DIAGNOSIS — M54.12 CERVICAL RADICULOPATHY: Primary | ICD-10-CM

## 2025-06-12 PROCEDURE — 97110 THERAPEUTIC EXERCISES: CPT | Mod: GP

## 2025-06-12 PROCEDURE — 97012 MECHANICAL TRACTION THERAPY: CPT | Mod: GP

## 2025-06-18 ENCOUNTER — THERAPY VISIT (OUTPATIENT)
Dept: PHYSICAL THERAPY | Facility: CLINIC | Age: 55
End: 2025-06-18
Attending: NURSE PRACTITIONER
Payer: COMMERCIAL

## 2025-06-18 DIAGNOSIS — M54.12 CERVICAL RADICULOPATHY: Primary | ICD-10-CM

## 2025-06-18 PROCEDURE — 97140 MANUAL THERAPY 1/> REGIONS: CPT | Mod: GP | Performed by: PHYSICAL THERAPIST

## 2025-06-18 PROCEDURE — 97110 THERAPEUTIC EXERCISES: CPT | Mod: GP | Performed by: PHYSICAL THERAPIST

## 2025-07-15 NOTE — PROGRESS NOTES
"Rheumatology Clinic Visit  [facility]  Dasha Latahumadustin, NANCY     Driss Saenz MRN# [id number]   YOB: 1970 Age: 54 year old   Date of Visit: 7/22/2025  Primary care provider: Kami Champagne          Assessment and Plan:     1.  Polymyalgia rheumatica  2.  Polyarthralgia    Patient presents today for an initial evaluation.  He states that he has been having a lot of joint muscle pain.  2 weeks after a work injury he states he could not use his arms for a while.  He was noticing numbness in his hands.  He is having a difficult time getting out of bed and a hard time moving.  He felt as if he had no strength and was not able to bring his arms above his head.  He states that he also found it very difficult to get dressed.  He still finds it is very difficult to get his ankle up towards him to put on his socks.  If he sitting in his truck for an hour or longer he has a very \"difficult time getting out of the truck.  He is also been having swelling of his right thumb and of his left wrist.  Physical examination today does show difficulty with raising arms above his head.  He does have swelling along the pad of his thumb as well as a little bit of swelling over top of the left wrist.    Discussed the specialty of rheumatology today.  His symptoms are highly suggestive of polymyalgia rheumatica.  It was interesting about this case however is the swelling that he has in the right thumb as well as in the left wrist which is more suggestive of an inflammatory arthritis.  At this time I recommend starting him on prednisone on a polymyalgia rheumatica protocol.  We may want to consider immunomodulatory therapy such as methotrexate in the future given the hand swelling.  We will check a CCP antibody as well as get x-rays of his hands and of his wrist today.  No evidence of GCA.  I would like the patient to follow-up with me in 4 to 6 weeks.    The longitudinal plan of care for the " diagnosis(es)/condition(s) as documented were addressed during this visit. Due to the added complexity in care, I will continue to support Driss in the subsequent management and with ongoing continuity of care.    Plan:     Schedule follow-up with Dasha Meyer PA-C in 4-6 weeks, okay to use a JIHAN spot   Imaging: xray hands and wrists  Labs: CCP antibody, CRP, Sed Rate  Medication recommendations:   Prednisone 5mg: Take 3 tabs (15mg) daily for 2 weeks, then take 2.5 (12.5mg) tabs daily for 2 weeks, then take 2 tabs (10mg) daily   # Prednisone Risks and Benefits: The risks and benefits of prednisone were discussed in detail and the patient verbalized understanding.  The risks discussed include, but are not limited to, weight gain, fluid retention, impaired wound healing, hyperglycemia, adrenal suppression, GI upset, peptic ulcer, hepatotoxicity, aseptic necrosis of the femoral and humeral heads, osteoporosis, myopathy, tendon rupture (particularly Achilles tendon), ocular changes including an increased intraocular pressure.  I encouraged reviewing the package insert and asking any questions about the medication.       Dasha Meyer, [facility]  Rheumatology         History of Present Illness:   Driss Saenz presents for evaluation of joint pain.      He states that he has joint and muscle pain. He states that around Feb/March he hit his head at work, since then he has had stiffness. It started 2 weeks later. He could not use his arms for awhile. His hands went numb. He could not get out of bed and had a hard time moving. He has no strength in his arms. He cannot bring his arms above his head. If he drives for an hour his legs are hard to move and they feel stiff. He has to stretch them out and it is uncomfortable walking. It takes 10-15 minutes and he will loosen up. He states that the move he moves the better he is able to move. He has a difficult time putting his socks on. Getting undressed or getting his ankle up  is very difficult. No headaches, double vision or jaw pain. No skin rashes. He has noticed some swelling in his wrist and his right thumb is swollen. His ankles will swell up by the end of the day. He took a 6 day course of medrol. He never took the Prednisone.     No known family history of autoimmune disorders.          Review of Systems:     Constitutional: negative  Skin: negative  Eyes: negative  Ears/Nose/Throat: negative  Respiratory: No shortness of breath, dyspnea on exertion, cough, or hemoptysis  Cardiovascular: negative  Gastrointestinal: negative  Genitourinary: negative  Musculoskeletal: as above  Neurologic: negative  Psychiatric: negative  Hematologic/Lymphatic/Immunologic: negative  Endocrine: negative         Active Problem List:     Patient Active Problem List    Diagnosis Date Noted    Cervical radiculopathy 06/10/2025     Priority: Medium    Cervical disc disorder at C6-C7 level with radiculopathy 05/12/2025     Priority: Medium    Degeneration of intervertebral disc of lumbar region with discogenic back pain and lower extremity pain 05/12/2025     Priority: Medium    Encounter for vasectomy 09/06/2024     Priority: Medium    Other male erectile dysfunction 08/29/2024     Priority: Medium    CARDIOVASCULAR SCREENING; LDL GOAL LESS THAN 160 10/31/2010     Priority: Medium            Past Medical History:   No past medical history on file.  Past Surgical History:   Procedure Laterality Date    VASECTOMY Bilateral 11/18/2024    Procedure: BILATERAL VASECTOMY;  Surgeon: Christian Salomon MD;  Location: [facility] OR            Social History:     Social History     Socioeconomic History    Marital status: Single     Spouse name: Not on file    Number of children: Not on file    Years of education: Not on file    Highest education level: Not on file   Occupational History    Not on file   Tobacco Use    Smoking status: Never    Smokeless tobacco: Current     Types: Chew    Tobacco comments:     Nicotine  pouches    Vaping Use    Vaping status: Never Used   Substance and Sexual Activity    Alcohol use: Yes     Comment: 3-6 beers a day or a case beer per week    Drug use: No    Sexual activity: Never   Other Topics Concern    Parent/sibling w/ CABG, MI or angioplasty before 65F 55M? No   Social History Narrative    Not on file     Social Drivers of Health     Financial Resource Strain: High Risk (8/29/2024)    Financial Resource Strain     Within the past 12 months, have you or your family members you live with been unable to get utilities (heat, electricity) when it was really needed?: Yes   Food Insecurity: High Risk (8/29/2024)    Food Insecurity     Within the past 12 months, did you worry that your food would run out before you got money to buy more?: No     Within the past 12 months, did the food you bought just not last and you didn t have money to get more?: Yes   Transportation Needs: Low Risk  (8/29/2024)    Transportation Needs     Within the past 12 months, has lack of transportation kept you from medical appointments, getting your medicines, non-medical meetings or appointments, work, or from getting things that you need?: No   Physical Activity: Not on file   Stress: Not on file   Social Connections: Not on file   Interpersonal Safety: Low Risk  (5/27/2025)    Interpersonal Safety     Do you feel physically and emotionally safe where you currently live?: Yes     Within the past 12 months, have you been hit, slapped, kicked or otherwise physically hurt by someone?: No     Within the past 12 months, have you been humiliated or emotionally abused in other ways by your partner or ex-partner?: No   Housing Stability: Low Risk  (8/29/2024)    Housing Stability     Do you have housing? : Yes     Are you worried about losing your housing?: No          Family History:     Family History   Problem Relation Age of Onset    Cancer Paternal Grandfather     Prostate Cancer Paternal Grandfather     Prostate Cancer  "Father             Allergies:     Allergies   Allergen Reactions    Sulfa Antibiotics             Medications:     Current Outpatient Medications   Medication Sig Dispense Refill    acetaminophen (TYLENOL) 325 MG tablet Take 325-650 mg by mouth every 6 hours as needed for mild pain.      ibuprofen (ADVIL/MOTRIN) 200 MG tablet Take 200 mg by mouth every 4 hours as needed for pain.      methocarbamol (ROBAXIN) 750 MG tablet 1 tab by mouth every 4 hrs. or 2 tabs every 8 hrs. as needed for muscle spasm. 40 tablet 0    pregabalin (LYRICA) 50 MG capsule Take 1 capsule (50 mg) by mouth 2 times daily. Start once daily at bedtime for 1 week and then increase to twice daily. 60 capsule 1    tadalafil (CIALIS) 5 MG tablet Take 1 tablet (5 mg) by mouth daily. 60 tablet 4            Physical Exam:   Blood pressure (!) 154/102, pulse 74, temperature 96.9  F (36.1  C), temperature source Tympanic, resp. rate 16, height 1.753 m (5' 9\"), weight 95.4 kg (210 lb 6.4 oz), SpO2 96%.  Wt Readings from Last 6 Encounters:   05/27/25 98 kg (216 lb)   05/12/25 98.9 kg (218 lb)   04/10/25 100.2 kg (221 lb)   01/20/25 91.2 kg (201 lb)   11/18/24 91.2 kg (201 lb)   11/04/24 91.6 kg (201 lb 14.4 oz)     Constitutional: well-developed, appearing stated age; cooperative  Eyes: nl conjunctiva, sclera  ENT: nl external ears, nose, hearing, lips  Neck: no mass or thyroid enlargement  Resp: No shortness of breath with normal conversation  MS: Tenderness over the right fourth MCP.  No synovitis or dactylitis noted.  He did have some swelling over the top of his left wrist and the pad of his right thumb.  Tenderness with moving the thumb.  Skin: no nail pitting, alopecia, rash, nodules or lesions.   Psych: nl judgement, orientation, memory, affect.           Data:   Imaging:  MRI lumbar spine 4/22/2025  IMPRESSION:  1.  Multilevel degenerative changes as detailed above. Postoperative changes at L4-5 and L5-S1 of left hemilaminotomy. Multilevel " degenerative changes as detailed above. Moderate canal stenosis at L4-5. The canal stenotic findings in the lumbar spine are   exacerbated by congenitally short pedicles.    MRI cervical spine 4/22/2025  IMPRESSION:  1.  Multilevel degenerative changes as detailed above most notably at C6-7 where there is severe right and moderate left foraminal narrowing. Please see above discussion for level specific changes.    Laboratory:  5/27/2025  Creatinine 0.78, GFR greater than 90  Albumin 4.2  ALT 23, AST 23  C-peptide 8.4  CRP 45.6  Rheumatoid factor less than 10  Uric acid 5.0  White blood cell count 8.7, hemoglobin 11.4, platelet count 497  Sed rate 52  Lyme disease negative  QUE negative

## 2025-07-22 ENCOUNTER — ANCILLARY PROCEDURE (OUTPATIENT)
Dept: GENERAL RADIOLOGY | Facility: CLINIC | Age: 55
End: 2025-07-22
Attending: PHYSICIAN ASSISTANT
Payer: COMMERCIAL

## 2025-07-22 ENCOUNTER — OFFICE VISIT (OUTPATIENT)
Dept: RHEUMATOLOGY | Facility: CLINIC | Age: 55
End: 2025-07-22
Attending: NURSE PRACTITIONER
Payer: COMMERCIAL

## 2025-07-22 VITALS
DIASTOLIC BLOOD PRESSURE: 102 MMHG | SYSTOLIC BLOOD PRESSURE: 154 MMHG | HEIGHT: 69 IN | TEMPERATURE: 96.9 F | HEART RATE: 74 BPM | BODY MASS INDEX: 31.16 KG/M2 | WEIGHT: 210.4 LBS | OXYGEN SATURATION: 96 % | RESPIRATION RATE: 16 BRPM

## 2025-07-22 DIAGNOSIS — M35.3 POLYMYALGIA RHEUMATICA: ICD-10-CM

## 2025-07-22 DIAGNOSIS — M35.3 POLYMYALGIA RHEUMATICA: Primary | ICD-10-CM

## 2025-07-22 DIAGNOSIS — M25.50 POLYARTHRALGIA: ICD-10-CM

## 2025-07-22 LAB
CRP SERPL-MCNC: 14.3 MG/L
ERYTHROCYTE [SEDIMENTATION RATE] IN BLOOD BY WESTERGREN METHOD: 28 MM/HR (ref 0–20)

## 2025-07-22 PROCEDURE — 3080F DIAST BP >= 90 MM HG: CPT | Performed by: PHYSICIAN ASSISTANT

## 2025-07-22 PROCEDURE — 99204 OFFICE O/P NEW MOD 45 MIN: CPT | Performed by: PHYSICIAN ASSISTANT

## 2025-07-22 PROCEDURE — 86200 CCP ANTIBODY: CPT | Performed by: PHYSICIAN ASSISTANT

## 2025-07-22 PROCEDURE — 73110 X-RAY EXAM OF WRIST: CPT | Mod: TC | Performed by: RADIOLOGY

## 2025-07-22 PROCEDURE — 36415 COLL VENOUS BLD VENIPUNCTURE: CPT | Performed by: PHYSICIAN ASSISTANT

## 2025-07-22 PROCEDURE — 85652 RBC SED RATE AUTOMATED: CPT | Performed by: PHYSICIAN ASSISTANT

## 2025-07-22 PROCEDURE — 3077F SYST BP >= 140 MM HG: CPT | Performed by: PHYSICIAN ASSISTANT

## 2025-07-22 PROCEDURE — 73130 X-RAY EXAM OF HAND: CPT | Mod: TC | Performed by: INTERNAL MEDICINE

## 2025-07-22 PROCEDURE — 86140 C-REACTIVE PROTEIN: CPT | Performed by: PHYSICIAN ASSISTANT

## 2025-07-22 PROCEDURE — G2211 COMPLEX E/M VISIT ADD ON: HCPCS | Performed by: PHYSICIAN ASSISTANT

## 2025-07-22 RX ORDER — IBUPROFEN 200 MG
200 TABLET ORAL EVERY 4 HOURS PRN
COMMUNITY

## 2025-07-22 RX ORDER — ACETAMINOPHEN 325 MG/1
325-650 TABLET ORAL EVERY 6 HOURS PRN
COMMUNITY

## 2025-07-22 RX ORDER — PREDNISONE 5 MG/1
TABLET ORAL
Qty: 90 TABLET | Refills: 0 | Status: SHIPPED | OUTPATIENT
Start: 2025-07-22

## 2025-07-22 NOTE — PATIENT INSTRUCTIONS
After Visit Instructions:     Thank you for coming to [facility] for your care. It is my goal to partner with you to help you reach your optimal state of health.       Plan:     Schedule follow-up with Dasha Meyer PA-C in 4-6 weeks, okay to use a JIHAN spot   Imaging: xray hands and wrists  Labs: CCP antibody, CRP, Sed Rate  Medication recommendations:   Prednisone 5mg: Take 3 tabs (15mg) daily for 2 weeks, then take 2.5 (12.5mg) tabs daily for 2 weeks, then take 2 tabs (10mg) daily   # Prednisone Risks and Benefits: The risks and benefits of prednisone were discussed in detail and the patient verbalized understanding.  The risks discussed include, but are not limited to, weight gain, fluid retention, impaired wound healing, hyperglycemia, adrenal suppression, GI upset, peptic ulcer, hepatotoxicity, aseptic necrosis of the femoral and humeral heads, osteoporosis, myopathy, tendon rupture (particularly Achilles tendon), ocular changes including an increased intraocular pressure.  I encouraged reviewing the package insert and asking any questions about the medication.       Dasha Meyer PA-C  [facility]  [facility]    Contact information: [facility]  Clinic Number:  [phone]  Please call or send a [employer] message with any questions about your care

## 2025-07-24 LAB — CCP AB SER IA-ACNC: 1.1 U/ML

## 2025-08-20 ENCOUNTER — OFFICE VISIT (OUTPATIENT)
Dept: RHEUMATOLOGY | Facility: CLINIC | Age: 55
End: 2025-08-20
Payer: COMMERCIAL

## 2025-08-20 VITALS
TEMPERATURE: 96.8 F | WEIGHT: 209.4 LBS | DIASTOLIC BLOOD PRESSURE: 88 MMHG | OXYGEN SATURATION: 99 % | BODY MASS INDEX: 31.01 KG/M2 | HEIGHT: 69 IN | RESPIRATION RATE: 16 BRPM | SYSTOLIC BLOOD PRESSURE: 140 MMHG | HEART RATE: 79 BPM

## 2025-08-20 DIAGNOSIS — M35.3 POLYMYALGIA RHEUMATICA: Primary | ICD-10-CM

## 2025-08-20 DIAGNOSIS — M54.12 CERVICAL RADICULOPATHY: ICD-10-CM

## 2025-08-20 DIAGNOSIS — Z79.52 ON PREDNISONE THERAPY: ICD-10-CM

## 2025-08-20 LAB
CRP SERPL-MCNC: <3 MG/L
ERYTHROCYTE [SEDIMENTATION RATE] IN BLOOD BY WESTERGREN METHOD: 13 MM/HR (ref 0–20)

## 2025-08-20 PROCEDURE — 36415 COLL VENOUS BLD VENIPUNCTURE: CPT | Performed by: PHYSICIAN ASSISTANT

## 2025-08-20 PROCEDURE — 3077F SYST BP >= 140 MM HG: CPT | Performed by: PHYSICIAN ASSISTANT

## 2025-08-20 PROCEDURE — 3079F DIAST BP 80-89 MM HG: CPT | Performed by: PHYSICIAN ASSISTANT

## 2025-08-20 PROCEDURE — 86140 C-REACTIVE PROTEIN: CPT | Performed by: PHYSICIAN ASSISTANT

## 2025-08-20 PROCEDURE — 99214 OFFICE O/P EST MOD 30 MIN: CPT | Performed by: PHYSICIAN ASSISTANT

## 2025-08-20 PROCEDURE — G2211 COMPLEX E/M VISIT ADD ON: HCPCS | Performed by: PHYSICIAN ASSISTANT

## 2025-08-20 PROCEDURE — 85652 RBC SED RATE AUTOMATED: CPT | Performed by: PHYSICIAN ASSISTANT

## 2025-08-20 PROCEDURE — 1125F AMNT PAIN NOTED PAIN PRSNT: CPT | Performed by: PHYSICIAN ASSISTANT

## 2025-08-20 RX ORDER — PREDNISONE 1 MG/1
TABLET ORAL
Qty: 120 TABLET | Refills: 2 | Status: SHIPPED | OUTPATIENT
Start: 2025-08-20

## 2025-08-20 RX ORDER — PREDNISONE 5 MG/1
TABLET ORAL
Qty: 90 TABLET | Refills: 1 | Status: SHIPPED | OUTPATIENT
Start: 2025-08-20

## 2025-08-20 ASSESSMENT — PAIN SCALES - GENERAL: PAINLEVEL_OUTOF10: MILD PAIN (2)

## 2025-08-21 ENCOUNTER — PATIENT OUTREACH (OUTPATIENT)
Dept: CARE COORDINATION | Facility: CLINIC | Age: 55
End: 2025-08-21
Payer: COMMERCIAL

## 2025-08-25 ENCOUNTER — PATIENT OUTREACH (OUTPATIENT)
Dept: CARE COORDINATION | Facility: CLINIC | Age: 55
End: 2025-08-25
Payer: COMMERCIAL

## 2025-08-26 ENCOUNTER — OFFICE VISIT (OUTPATIENT)
Dept: PALLIATIVE MEDICINE | Facility: OTHER | Age: 55
End: 2025-08-26
Attending: PHYSICIAN ASSISTANT
Payer: COMMERCIAL

## 2025-08-26 VITALS — OXYGEN SATURATION: 98 % | DIASTOLIC BLOOD PRESSURE: 89 MMHG | HEART RATE: 61 BPM | SYSTOLIC BLOOD PRESSURE: 141 MMHG

## 2025-08-26 DIAGNOSIS — M54.12 CERVICAL RADICULOPATHY: ICD-10-CM

## 2025-08-26 PROCEDURE — 99213 OFFICE O/P EST LOW 20 MIN: CPT | Performed by: STUDENT IN AN ORGANIZED HEALTH CARE EDUCATION/TRAINING PROGRAM

## 2025-08-26 PROCEDURE — 1125F AMNT PAIN NOTED PAIN PRSNT: CPT | Performed by: STUDENT IN AN ORGANIZED HEALTH CARE EDUCATION/TRAINING PROGRAM

## 2025-08-26 PROCEDURE — 99204 OFFICE O/P NEW MOD 45 MIN: CPT | Performed by: STUDENT IN AN ORGANIZED HEALTH CARE EDUCATION/TRAINING PROGRAM

## 2025-08-26 PROCEDURE — 3079F DIAST BP 80-89 MM HG: CPT | Performed by: STUDENT IN AN ORGANIZED HEALTH CARE EDUCATION/TRAINING PROGRAM

## 2025-08-26 PROCEDURE — 3077F SYST BP >= 140 MM HG: CPT | Performed by: STUDENT IN AN ORGANIZED HEALTH CARE EDUCATION/TRAINING PROGRAM

## 2025-08-26 ASSESSMENT — PAIN SCALES - GENERAL: PAINLEVEL_OUTOF10: SEVERE PAIN (7)

## (undated) DEVICE — ESU NDL COLORADO MICRO 3CM STR N103A

## (undated) DEVICE — DRSG KERLIX FLUFFS X5

## (undated) DEVICE — SUPPORTER ATHLETIC MED LATEX

## (undated) DEVICE — BLADE CLIPPER 4406

## (undated) DEVICE — GOWN XLG DISP 9545

## (undated) DEVICE — PACK LAPAROTOMY CUSTOM LAKES

## (undated) DEVICE — SOL NACL 0.9% IRRIG 1000ML BOTTLE 07138-09

## (undated) DEVICE — DECANTER VIAL 2006S

## (undated) DEVICE — DRSG TELFA 3X8" 1238

## (undated) DEVICE — GLOVE BIOGEL PI ULTRATOUCH G SZ 7.5 42175

## (undated) DEVICE — SU MONOCRYL 4-0 PS-2 18" UND Y496G

## (undated) RX ORDER — ONDANSETRON 2 MG/ML
INJECTION INTRAMUSCULAR; INTRAVENOUS
Status: DISPENSED
Start: 2024-11-18

## (undated) RX ORDER — CEFAZOLIN SODIUM/WATER 2 G/20 ML
SYRINGE (ML) INTRAVENOUS
Status: DISPENSED
Start: 2024-11-18

## (undated) RX ORDER — FENTANYL CITRATE 50 UG/ML
INJECTION, SOLUTION INTRAMUSCULAR; INTRAVENOUS
Status: DISPENSED
Start: 2024-11-18

## (undated) RX ORDER — PROPOFOL 10 MG/ML
INJECTION, EMULSION INTRAVENOUS
Status: DISPENSED
Start: 2024-11-18

## (undated) RX ORDER — GINSENG 100 MG
CAPSULE ORAL
Status: DISPENSED
Start: 2024-11-18

## (undated) RX ORDER — BUPIVACAINE HYDROCHLORIDE 5 MG/ML
INJECTION, SOLUTION EPIDURAL; INTRACAUDAL
Status: DISPENSED
Start: 2024-11-18

## (undated) RX ORDER — DEXAMETHASONE SODIUM PHOSPHATE 4 MG/ML
INJECTION, SOLUTION INTRA-ARTICULAR; INTRALESIONAL; INTRAMUSCULAR; INTRAVENOUS; SOFT TISSUE
Status: DISPENSED
Start: 2024-11-18

## (undated) RX ORDER — KETOROLAC TROMETHAMINE 30 MG/ML
INJECTION, SOLUTION INTRAMUSCULAR; INTRAVENOUS
Status: DISPENSED
Start: 2024-11-18